# Patient Record
Sex: MALE | Race: WHITE | NOT HISPANIC OR LATINO | URBAN - METROPOLITAN AREA
[De-identification: names, ages, dates, MRNs, and addresses within clinical notes are randomized per-mention and may not be internally consistent; named-entity substitution may affect disease eponyms.]

---

## 2017-05-17 ENCOUNTER — GENERIC CONVERSION - ENCOUNTER (OUTPATIENT)
Dept: OTHER | Facility: OTHER | Age: 77
End: 2017-05-17

## 2020-03-04 ENCOUNTER — TELEPHONE (OUTPATIENT)
Dept: GASTROENTEROLOGY | Facility: CLINIC | Age: 80
End: 2020-03-04

## 2020-03-04 NOTE — TELEPHONE ENCOUNTER
Pt was to follow up prior to us ordering a cologuard in November 2018; pt cancelled appt at that time and has not been seen by us  I suggested an OV to discuss as we haven't seen him and medicare may not cover screening after age 78  Pt stated is it very difficult for him to get to our office; lives in [place]; he stated he may have to transfer to his local GI  I suggested he see his PCP as they can order  discuss cologuard as well  Pt appreciative of information and will follow up with PCP  I instructed pt to call us if any further needs

## 2020-03-04 NOTE — TELEPHONE ENCOUNTER
Pt leftr [facility] [place] asking for  [phone] to tell him when he needs to have a cologuard done in the mail?

## 2023-10-17 ENCOUNTER — HOSPITAL ENCOUNTER (OUTPATIENT)
Dept: RADIOLOGY | Facility: HOSPITAL | Age: 83
Discharge: HOME | End: 2023-10-17
Payer: COMMERCIAL

## 2023-10-17 ENCOUNTER — HOSPITAL ENCOUNTER (OUTPATIENT)
Dept: MRI IMAGING | Facility: HOSPITAL | Age: 83
Discharge: HOME | End: 2023-10-17
Payer: COMMERCIAL

## 2023-10-17 ENCOUNTER — DOCUMENTATION (OUTPATIENT)
Dept: NEUROSURGERY | Facility: CLINIC | Age: 83
End: 2023-10-17

## 2023-10-17 DIAGNOSIS — Z98.2 S/P VP SHUNT: Primary | ICD-10-CM

## 2023-10-17 DIAGNOSIS — M25.552 PAIN IN LEFT HIP: ICD-10-CM

## 2023-10-17 DIAGNOSIS — Z98.2 S/P VP SHUNT: ICD-10-CM

## 2023-10-17 PROCEDURE — 73721 MRI JNT OF LWR EXTRE W/O DYE: CPT

## 2023-10-18 NOTE — PROGRESS NOTES
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 Pt had an MRI of the Hip done on 11/17/23. His post MRI skull xray showed that his  shunt setting had changed from 1.5 to 1.0. Neurosurgery was consulted to help re-adjust the  shunt. Discussed with pt the shunt adjustment. He was agreeable to have his shunt re-adjusted to the Pre-MRI setting. Using the Medtronic Strata shunt , the shunt reservoir was identified and the  used to re-adjust the  shunt from 1.0 to the pre-mri setting of 1.5. Xray skull was obtained after the shunt adjustment that confirmed the setting of 1.5    Pt tolerated the shunt adjustment well.

## 2024-07-19 ENCOUNTER — HOSPITAL ENCOUNTER (EMERGENCY)
Dept: HOSPITAL 99 - EMR | Age: 84
LOS: 1 days | Discharge: HOME | End: 2024-07-20
Payer: COMMERCIAL

## 2024-07-19 VITALS — DIASTOLIC BLOOD PRESSURE: 117 MMHG | RESPIRATION RATE: 18 BRPM | SYSTOLIC BLOOD PRESSURE: 170 MMHG

## 2024-07-19 VITALS — SYSTOLIC BLOOD PRESSURE: 187 MMHG | RESPIRATION RATE: 20 BRPM | DIASTOLIC BLOOD PRESSURE: 112 MMHG

## 2024-07-19 VITALS — RESPIRATION RATE: 23 BRPM | SYSTOLIC BLOOD PRESSURE: 145 MMHG | DIASTOLIC BLOOD PRESSURE: 104 MMHG

## 2024-07-19 VITALS — SYSTOLIC BLOOD PRESSURE: 146 MMHG | DIASTOLIC BLOOD PRESSURE: 104 MMHG | RESPIRATION RATE: 19 BRPM

## 2024-07-19 VITALS — RESPIRATION RATE: 18 BRPM

## 2024-07-19 VITALS — RESPIRATION RATE: 16 BRPM

## 2024-07-19 VITALS — RESPIRATION RATE: 14 BRPM | DIASTOLIC BLOOD PRESSURE: 117 MMHG | SYSTOLIC BLOOD PRESSURE: 183 MMHG

## 2024-07-19 VITALS — SYSTOLIC BLOOD PRESSURE: 181 MMHG | DIASTOLIC BLOOD PRESSURE: 143 MMHG

## 2024-07-19 VITALS — RESPIRATION RATE: 23 BRPM

## 2024-07-19 VITALS — RESPIRATION RATE: 20 BRPM | SYSTOLIC BLOOD PRESSURE: 181 MMHG | DIASTOLIC BLOOD PRESSURE: 113 MMHG

## 2024-07-19 VITALS — RESPIRATION RATE: 26 BRPM | SYSTOLIC BLOOD PRESSURE: 165 MMHG | DIASTOLIC BLOOD PRESSURE: 132 MMHG

## 2024-07-19 VITALS — DIASTOLIC BLOOD PRESSURE: 113 MMHG | RESPIRATION RATE: 16 BRPM | SYSTOLIC BLOOD PRESSURE: 132 MMHG

## 2024-07-19 VITALS — SYSTOLIC BLOOD PRESSURE: 165 MMHG | DIASTOLIC BLOOD PRESSURE: 106 MMHG | RESPIRATION RATE: 19 BRPM

## 2024-07-19 VITALS — SYSTOLIC BLOOD PRESSURE: 204 MMHG | DIASTOLIC BLOOD PRESSURE: 123 MMHG | RESPIRATION RATE: 21 BRPM

## 2024-07-19 VITALS — BODY MASS INDEX: 19.8 KG/M2

## 2024-07-19 VITALS — RESPIRATION RATE: 19 BRPM

## 2024-07-19 VITALS — RESPIRATION RATE: 27 BRPM

## 2024-07-19 VITALS — RESPIRATION RATE: 22 BRPM

## 2024-07-19 DIAGNOSIS — J44.9: ICD-10-CM

## 2024-07-19 DIAGNOSIS — I10: ICD-10-CM

## 2024-07-19 DIAGNOSIS — F17.200: ICD-10-CM

## 2024-07-19 DIAGNOSIS — R11.2: Primary | ICD-10-CM

## 2024-07-19 LAB
ALBUMIN SERPL-MCNC: 4.8 G/DL (ref 3.5–5)
ALP SERPL-CCNC: 109 U/L (ref 38–126)
ALT SERPL-CCNC: 16 U/L (ref 0–50)
APTT PPP: 29 SEC (ref 23.4–35)
AST SERPL-CCNC: 25 U/L (ref 17–59)
BUN SERPL-MCNC: 14 MG/DL (ref 9–20)
CALCIUM SERPL-MCNC: 8.8 MG/DL (ref 8.4–10.2)
CHLORIDE SERPL-SCNC: 91 MMOL/L (ref 98–107)
CO2 SERPL-SCNC: 33 MMOL/L (ref 22–30)
EGFR: > 60
ERYTHROCYTE [DISTWIDTH] IN BLOOD BY AUTOMATED COUNT: 14.4 % (ref 11.5–14.5)
ESTIMATED CREATININE CLEARANCE: 53 ML/MIN
GLUCOSE SERPL-MCNC: 137 MG/DL (ref 70–99)
HCT VFR BLD AUTO: 46.5 % (ref 39–52)
HGB BLD-MCNC: 15.6 G/DL (ref 13–18)
INR PPP: 0.95
MCHC RBC AUTO-ENTMCNC: 33.5 G/DL (ref 33–37)
MCV RBC AUTO: 89.6 FL (ref 80–94)
NRBC BLD AUTO-RTO: 0 %
PLATELET # BLD AUTO: 289 10^3/UL (ref 130–400)
POTASSIUM SERPL-SCNC: 3.6 MMOL/L (ref 3.5–5.1)
PROT SERPL-MCNC: 7.7 G/DL (ref 6.3–8.2)
PROTHROMBIN TIME: 12.5 SEC (ref 11.4–14.6)
SODIUM SERPL-SCNC: 138 MMOL/L (ref 135–145)

## 2024-07-19 PROCEDURE — 99284 EMERGENCY DEPT VISIT MOD MDM: CPT

## 2024-07-19 PROCEDURE — 96374 THER/PROPH/DIAG INJ IV PUSH: CPT

## 2024-07-19 PROCEDURE — 96376 TX/PRO/DX INJ SAME DRUG ADON: CPT

## 2024-07-19 RX ADMIN — ONDANSETRON HYDROCHLORIDE 4 MG: 2 SOLUTION INTRAMUSCULAR; INTRAVENOUS at 19:54

## 2024-07-19 RX ADMIN — ONDANSETRON HYDROCHLORIDE 4 MG: 2 SOLUTION INTRAMUSCULAR; INTRAVENOUS at 22:42

## 2024-08-06 ENCOUNTER — HOSPITAL ENCOUNTER (INPATIENT)
Dept: HOSPITAL 99 - 4 EAST ACU | Age: 84
LOS: 3 days | Discharge: HOME HEALTH | DRG: 640 | End: 2024-08-09
Payer: COMMERCIAL

## 2024-08-06 VITALS — DIASTOLIC BLOOD PRESSURE: 100 MMHG | SYSTOLIC BLOOD PRESSURE: 143 MMHG | RESPIRATION RATE: 22 BRPM

## 2024-08-06 VITALS — RESPIRATION RATE: 24 BRPM | DIASTOLIC BLOOD PRESSURE: 106 MMHG | SYSTOLIC BLOOD PRESSURE: 155 MMHG

## 2024-08-06 VITALS — SYSTOLIC BLOOD PRESSURE: 135 MMHG | DIASTOLIC BLOOD PRESSURE: 102 MMHG | RESPIRATION RATE: 18 BRPM

## 2024-08-06 VITALS — DIASTOLIC BLOOD PRESSURE: 91 MMHG | RESPIRATION RATE: 23 BRPM | SYSTOLIC BLOOD PRESSURE: 153 MMHG

## 2024-08-06 VITALS — DIASTOLIC BLOOD PRESSURE: 100 MMHG | RESPIRATION RATE: 23 BRPM | SYSTOLIC BLOOD PRESSURE: 137 MMHG

## 2024-08-06 VITALS — RESPIRATION RATE: 20 BRPM

## 2024-08-06 VITALS — DIASTOLIC BLOOD PRESSURE: 95 MMHG | SYSTOLIC BLOOD PRESSURE: 147 MMHG | RESPIRATION RATE: 23 BRPM

## 2024-08-06 VITALS — BODY MASS INDEX: 18.6 KG/M2 | BODY MASS INDEX: 19.2 KG/M2

## 2024-08-06 VITALS — RESPIRATION RATE: 24 BRPM

## 2024-08-06 VITALS — SYSTOLIC BLOOD PRESSURE: 134 MMHG | RESPIRATION RATE: 20 BRPM | DIASTOLIC BLOOD PRESSURE: 91 MMHG

## 2024-08-06 VITALS — DIASTOLIC BLOOD PRESSURE: 107 MMHG | SYSTOLIC BLOOD PRESSURE: 142 MMHG | RESPIRATION RATE: 23 BRPM

## 2024-08-06 VITALS — RESPIRATION RATE: 12 BRPM

## 2024-08-06 VITALS — RESPIRATION RATE: 27 BRPM

## 2024-08-06 VITALS — RESPIRATION RATE: 19 BRPM | DIASTOLIC BLOOD PRESSURE: 94 MMHG | SYSTOLIC BLOOD PRESSURE: 134 MMHG

## 2024-08-06 VITALS — SYSTOLIC BLOOD PRESSURE: 164 MMHG | DIASTOLIC BLOOD PRESSURE: 106 MMHG | RESPIRATION RATE: 17 BRPM

## 2024-08-06 VITALS — RESPIRATION RATE: 26 BRPM

## 2024-08-06 VITALS — SYSTOLIC BLOOD PRESSURE: 140 MMHG | DIASTOLIC BLOOD PRESSURE: 96 MMHG

## 2024-08-06 VITALS — SYSTOLIC BLOOD PRESSURE: 136 MMHG | RESPIRATION RATE: 18 BRPM | DIASTOLIC BLOOD PRESSURE: 87 MMHG

## 2024-08-06 VITALS — RESPIRATION RATE: 22 BRPM

## 2024-08-06 VITALS — RESPIRATION RATE: 18 BRPM

## 2024-08-06 DIAGNOSIS — Z86.73: ICD-10-CM

## 2024-08-06 DIAGNOSIS — E46: Primary | ICD-10-CM

## 2024-08-06 DIAGNOSIS — G93.41: ICD-10-CM

## 2024-08-06 DIAGNOSIS — R47.1: ICD-10-CM

## 2024-08-06 DIAGNOSIS — E87.6: ICD-10-CM

## 2024-08-06 DIAGNOSIS — E83.42: ICD-10-CM

## 2024-08-06 DIAGNOSIS — E83.51: ICD-10-CM

## 2024-08-06 DIAGNOSIS — J44.9: ICD-10-CM

## 2024-08-06 DIAGNOSIS — I10: ICD-10-CM

## 2024-08-06 DIAGNOSIS — G91.2: ICD-10-CM

## 2024-08-06 DIAGNOSIS — N40.0: ICD-10-CM

## 2024-08-06 DIAGNOSIS — F17.210: ICD-10-CM

## 2024-08-06 DIAGNOSIS — J98.11: ICD-10-CM

## 2024-08-06 LAB
ALBUMIN SERPL-MCNC: 4.1 G/DL (ref 3.5–5)
ALP SERPL-CCNC: 74 U/L (ref 38–126)
ALT SERPL-CCNC: 14 U/L (ref 0–50)
AST SERPL-CCNC: 26 U/L (ref 17–59)
BUN SERPL-MCNC: 32 MG/DL (ref 9–20)
CALCIUM SERPL-MCNC: 6.5 MG/DL (ref 8.4–10.2)
CHLORIDE SERPL-SCNC: 101 MMOL/L (ref 98–107)
CO2 SERPL-SCNC: 27 MMOL/L (ref 22–30)
EGFR: 55.19
ERYTHROCYTE [DISTWIDTH] IN BLOOD BY AUTOMATED COUNT: 14.5 % (ref 11.5–14.5)
ESTIMATED CREATININE CLEARANCE: 39 ML/MIN
GLUCOSE SERPL-MCNC: 90 MG/DL (ref 70–99)
HCT VFR BLD AUTO: 37.2 % (ref 39–52)
HGB BLD-MCNC: 12.7 G/DL (ref 13–18)
MAGNESIUM SERPL-MCNC: 0.5 MG/DL (ref 1.6–2.3)
MCHC RBC AUTO-ENTMCNC: 34.1 G/DL (ref 33–37)
MCV RBC AUTO: 89.9 FL (ref 80–94)
NRBC BLD AUTO-RTO: 0 %
PLATELET # BLD AUTO: 338 10^3/UL (ref 130–400)
POTASSIUM SERPL-SCNC: 3.4 MMOL/L (ref 3.5–5.1)
PROT SERPL-MCNC: 6.7 G/DL (ref 6.3–8.2)
SODIUM SERPL-SCNC: 138 MMOL/L (ref 135–145)

## 2024-08-06 RX ADMIN — MAGNESIUM SULFATE IN DEXTROSE 100: 10 INJECTION, SOLUTION INTRAVENOUS at 19:45

## 2024-08-06 RX ADMIN — SODIUM CHLORIDE 1000: 900 INJECTION, SOLUTION INTRAVENOUS at 18:47

## 2024-08-06 RX ADMIN — POTASSIUM CHLORIDE 40 MEQ: 1.5 SOLUTION ORAL at 21:46

## 2024-08-06 RX ADMIN — CEFTRIAXONE 1000 MG: 1 INJECTION, POWDER, FOR SOLUTION INTRAMUSCULAR; INTRAVENOUS at 20:56

## 2024-08-06 RX ADMIN — AZITHROMYCIN MONOHYDRATE 250: 500 INJECTION, POWDER, LYOPHILIZED, FOR SOLUTION INTRAVENOUS at 20:56

## 2024-08-06 RX ADMIN — CALCIUM GLUCONATE 1000 MG: 98 INJECTION, SOLUTION INTRAVENOUS at 18:34

## 2024-08-07 VITALS — HEART RATE: 78 BPM | SYSTOLIC BLOOD PRESSURE: 120 MMHG | DIASTOLIC BLOOD PRESSURE: 83 MMHG | OXYGEN SATURATION: 97 %

## 2024-08-07 VITALS — DIASTOLIC BLOOD PRESSURE: 78 MMHG | SYSTOLIC BLOOD PRESSURE: 117 MMHG | RESPIRATION RATE: 18 BRPM

## 2024-08-07 VITALS — RESPIRATION RATE: 18 BRPM | SYSTOLIC BLOOD PRESSURE: 126 MMHG | DIASTOLIC BLOOD PRESSURE: 92 MMHG

## 2024-08-07 VITALS — RESPIRATION RATE: 16 BRPM | DIASTOLIC BLOOD PRESSURE: 77 MMHG | SYSTOLIC BLOOD PRESSURE: 110 MMHG

## 2024-08-07 VITALS — DIASTOLIC BLOOD PRESSURE: 83 MMHG | OXYGEN SATURATION: 97 % | SYSTOLIC BLOOD PRESSURE: 120 MMHG | HEART RATE: 79 BPM

## 2024-08-07 VITALS — DIASTOLIC BLOOD PRESSURE: 84 MMHG | SYSTOLIC BLOOD PRESSURE: 120 MMHG | RESPIRATION RATE: 18 BRPM

## 2024-08-07 VITALS — SYSTOLIC BLOOD PRESSURE: 127 MMHG | DIASTOLIC BLOOD PRESSURE: 83 MMHG | RESPIRATION RATE: 18 BRPM

## 2024-08-07 VITALS — DIASTOLIC BLOOD PRESSURE: 87 MMHG | SYSTOLIC BLOOD PRESSURE: 143 MMHG | RESPIRATION RATE: 18 BRPM

## 2024-08-07 LAB
ALBUMIN SERPL-MCNC: 3.5 G/DL (ref 3.5–5)
ALP SERPL-CCNC: 81 U/L (ref 38–126)
ALT SERPL-CCNC: 11 U/L (ref 0–50)
AST SERPL-CCNC: 28 U/L (ref 17–59)
BUN SERPL-MCNC: 21 MG/DL (ref 9–20)
CALCIUM SERPL-MCNC: 6.5 MG/DL (ref 8.4–10.2)
CHLORIDE SERPL-SCNC: 105 MMOL/L (ref 98–107)
CO2 SERPL-SCNC: 23 MMOL/L (ref 22–30)
EGFR: > 60
ERYTHROCYTE [DISTWIDTH] IN BLOOD BY AUTOMATED COUNT: 14.5 % (ref 11.5–14.5)
ESTIMATED CREATININE CLEARANCE: 49 ML/MIN
GLUCOSE SERPL-MCNC: 81 MG/DL (ref 70–99)
HCT VFR BLD AUTO: 36.3 % (ref 39–52)
HGB BLD-MCNC: 12.9 G/DL (ref 13–18)
MAGNESIUM SERPL-MCNC: 0.8 MG/DL (ref 1.6–2.3)
MCHC RBC AUTO-ENTMCNC: 35.5 G/DL (ref 33–37)
MCV RBC AUTO: 88.5 FL (ref 80–94)
PLATELET # BLD AUTO: 281 10^3/UL (ref 130–400)
POTASSIUM SERPL-SCNC: 3.2 MMOL/L (ref 3.5–5.1)
PROCALCITONIN SERPL-MCNC: < 0.05 NG/ML (ref 0–0.25)
PROT SERPL-MCNC: 6 G/DL (ref 6.3–8.2)
SODIUM SERPL-SCNC: 139 MMOL/L (ref 135–145)

## 2024-08-07 RX ADMIN — POTASSIUM CHLORIDE 40 MEQ: 1500 TABLET, EXTENDED RELEASE ORAL at 08:15

## 2024-08-07 RX ADMIN — TAMSULOSIN HYDROCHLORIDE 0.4 MG: 0.4 CAPSULE ORAL at 07:23

## 2024-08-07 RX ADMIN — MAGNESIUM SULFATE 100: 4 INJECTION INTRAVENOUS at 08:15

## 2024-08-07 RX ADMIN — SUCRALFATE 1 GRAM: 1 TABLET ORAL at 07:23

## 2024-08-07 RX ADMIN — HEPARIN SODIUM 5000 UNITS: 5000 INJECTION, SOLUTION INTRAVENOUS; SUBCUTANEOUS at 21:04

## 2024-08-07 RX ADMIN — PANTOPRAZOLE SODIUM 40 MG: 40 TABLET, DELAYED RELEASE ORAL at 21:04

## 2024-08-07 RX ADMIN — PANTOPRAZOLE SODIUM 40 MG: 40 TABLET, DELAYED RELEASE ORAL at 07:23

## 2024-08-07 RX ADMIN — CALCIUM GLUCONATE 100: 10 INJECTION, SOLUTION INTRAVENOUS at 11:29

## 2024-08-07 RX ADMIN — LISINOPRIL 40 MG: 20 TABLET ORAL at 07:24

## 2024-08-07 RX ADMIN — HEPARIN SODIUM 5000 UNITS: 5000 INJECTION, SOLUTION INTRAVENOUS; SUBCUTANEOUS at 07:23

## 2024-08-08 VITALS — DIASTOLIC BLOOD PRESSURE: 82 MMHG | RESPIRATION RATE: 16 BRPM | SYSTOLIC BLOOD PRESSURE: 135 MMHG

## 2024-08-08 VITALS — SYSTOLIC BLOOD PRESSURE: 123 MMHG | DIASTOLIC BLOOD PRESSURE: 93 MMHG | RESPIRATION RATE: 16 BRPM

## 2024-08-08 VITALS — DIASTOLIC BLOOD PRESSURE: 85 MMHG | RESPIRATION RATE: 16 BRPM | SYSTOLIC BLOOD PRESSURE: 121 MMHG

## 2024-08-08 VITALS — SYSTOLIC BLOOD PRESSURE: 169 MMHG | RESPIRATION RATE: 16 BRPM | DIASTOLIC BLOOD PRESSURE: 109 MMHG

## 2024-08-08 VITALS — RESPIRATION RATE: 18 BRPM | SYSTOLIC BLOOD PRESSURE: 129 MMHG | DIASTOLIC BLOOD PRESSURE: 95 MMHG

## 2024-08-08 VITALS — DIASTOLIC BLOOD PRESSURE: 104 MMHG | RESPIRATION RATE: 18 BRPM | SYSTOLIC BLOOD PRESSURE: 139 MMHG

## 2024-08-08 LAB
ALBUMIN SERPL-MCNC: 3.6 G/DL (ref 3.5–5)
ALP SERPL-CCNC: 76 U/L (ref 38–126)
ALT SERPL-CCNC: 12 U/L (ref 0–50)
AST SERPL-CCNC: 28 U/L (ref 17–59)
BUN SERPL-MCNC: 22 MG/DL (ref 9–20)
CALCIUM SERPL-MCNC: 7.3 MG/DL (ref 8.4–10.2)
CHLORIDE SERPL-SCNC: 104 MMOL/L (ref 98–107)
CO2 SERPL-SCNC: 24 MMOL/L (ref 22–30)
EGFR: > 60
ERYTHROCYTE [DISTWIDTH] IN BLOOD BY AUTOMATED COUNT: 14.3 % (ref 11.5–14.5)
ESTIMATED CREATININE CLEARANCE: 62 ML/MIN
GLUCOSE SERPL-MCNC: 83 MG/DL (ref 70–99)
HCT VFR BLD AUTO: 36.8 % (ref 39–52)
HGB BLD-MCNC: 12.6 G/DL (ref 13–18)
MAGNESIUM SERPL-MCNC: 1.7 MG/DL (ref 1.6–2.3)
MCHC RBC AUTO-ENTMCNC: 34.2 G/DL (ref 33–37)
MCV RBC AUTO: 88.9 FL (ref 80–94)
NRBC BLD AUTO-RTO: 0 %
PLATELET # BLD AUTO: 286 10^3/UL (ref 130–400)
POTASSIUM SERPL-SCNC: 4.2 MMOL/L (ref 3.5–5.1)
PROT SERPL-MCNC: 6.2 G/DL (ref 6.3–8.2)
SODIUM SERPL-SCNC: 136 MMOL/L (ref 135–145)

## 2024-08-08 RX ADMIN — NICOTINE 14 MG: 14 PATCH TRANSDERMAL at 15:28

## 2024-08-08 RX ADMIN — PANTOPRAZOLE SODIUM 40 MG: 40 TABLET, DELAYED RELEASE ORAL at 20:39

## 2024-08-08 RX ADMIN — SUCRALFATE 1 GRAM: 1 TABLET ORAL at 07:59

## 2024-08-08 RX ADMIN — LISINOPRIL 40 MG: 20 TABLET ORAL at 07:59

## 2024-08-08 RX ADMIN — PANTOPRAZOLE SODIUM 40 MG: 40 TABLET, DELAYED RELEASE ORAL at 07:59

## 2024-08-08 RX ADMIN — MAGNESIUM SULFATE HEPTAHYDRATE 50: 2 INJECTION, SOLUTION INTRAVENOUS at 12:48

## 2024-08-08 RX ADMIN — HEPARIN SODIUM 5000 UNITS: 5000 INJECTION, SOLUTION INTRAVENOUS; SUBCUTANEOUS at 07:59

## 2024-08-08 RX ADMIN — TAMSULOSIN HYDROCHLORIDE 0.4 MG: 0.4 CAPSULE ORAL at 07:59

## 2024-08-08 RX ADMIN — HEPARIN SODIUM 5000 UNITS: 5000 INJECTION, SOLUTION INTRAVENOUS; SUBCUTANEOUS at 20:39

## 2024-08-09 VITALS — DIASTOLIC BLOOD PRESSURE: 99 MMHG | SYSTOLIC BLOOD PRESSURE: 132 MMHG | RESPIRATION RATE: 18 BRPM

## 2024-08-09 VITALS — SYSTOLIC BLOOD PRESSURE: 170 MMHG | RESPIRATION RATE: 20 BRPM | DIASTOLIC BLOOD PRESSURE: 103 MMHG

## 2024-08-09 VITALS — RESPIRATION RATE: 16 BRPM | SYSTOLIC BLOOD PRESSURE: 151 MMHG | DIASTOLIC BLOOD PRESSURE: 108 MMHG

## 2024-08-09 VITALS — DIASTOLIC BLOOD PRESSURE: 89 MMHG | RESPIRATION RATE: 16 BRPM | SYSTOLIC BLOOD PRESSURE: 118 MMHG

## 2024-08-09 LAB
ALBUMIN SERPL-MCNC: 3.7 G/DL (ref 3.5–5)
ALP SERPL-CCNC: 82 U/L (ref 38–126)
ALT SERPL-CCNC: 12 U/L (ref 0–50)
AST SERPL-CCNC: 21 U/L (ref 17–59)
BUN SERPL-MCNC: 18 MG/DL (ref 9–20)
CALCIUM SERPL-MCNC: 7.4 MG/DL (ref 8.4–10.2)
CHLORIDE SERPL-SCNC: 102 MMOL/L (ref 98–107)
CO2 SERPL-SCNC: 27 MMOL/L (ref 22–30)
EGFR: > 60
ERYTHROCYTE [DISTWIDTH] IN BLOOD BY AUTOMATED COUNT: 14.4 % (ref 11.5–14.5)
ESTIMATED CREATININE CLEARANCE: 55 ML/MIN
GLUCOSE SERPL-MCNC: 88 MG/DL (ref 70–99)
HCT VFR BLD AUTO: 35.1 % (ref 39–52)
HGB BLD-MCNC: 12.1 G/DL (ref 13–18)
MAGNESIUM SERPL-MCNC: 1.8 MG/DL (ref 1.6–2.3)
MCHC RBC AUTO-ENTMCNC: 34.5 G/DL (ref 33–37)
MCV RBC AUTO: 86.9 FL (ref 80–94)
NRBC BLD AUTO-RTO: 0 %
PLATELET # BLD AUTO: 326 10^3/UL (ref 130–400)
POTASSIUM SERPL-SCNC: 4.2 MMOL/L (ref 3.5–5.1)
PROT SERPL-MCNC: 6.1 G/DL (ref 6.3–8.2)
SODIUM SERPL-SCNC: 135 MMOL/L (ref 135–145)

## 2024-08-09 RX ADMIN — SUCRALFATE 1 GRAM: 1 TABLET ORAL at 08:35

## 2024-08-09 RX ADMIN — NICOTINE 14 MG: 14 PATCH TRANSDERMAL at 08:35

## 2024-08-09 RX ADMIN — PANTOPRAZOLE SODIUM 40 MG: 40 TABLET, DELAYED RELEASE ORAL at 08:22

## 2024-08-09 RX ADMIN — HEPARIN SODIUM 5000 UNITS: 5000 INJECTION, SOLUTION INTRAVENOUS; SUBCUTANEOUS at 08:21

## 2024-08-09 RX ADMIN — LISINOPRIL 40 MG: 20 TABLET ORAL at 08:22

## 2024-08-09 RX ADMIN — TAMSULOSIN HYDROCHLORIDE 0.4 MG: 0.4 CAPSULE ORAL at 08:22

## 2024-08-12 ENCOUNTER — HOSPITAL ENCOUNTER (INPATIENT)
Dept: HOSPITAL 99 - 4 EAST ACU | Age: 84
LOS: 1 days | Discharge: LEFT BEFORE DISCHARGE | DRG: 392 | End: 2024-08-13
Payer: COMMERCIAL

## 2024-08-12 VITALS — RESPIRATION RATE: 21 BRPM

## 2024-08-12 VITALS — DIASTOLIC BLOOD PRESSURE: 101 MMHG | RESPIRATION RATE: 18 BRPM | SYSTOLIC BLOOD PRESSURE: 154 MMHG

## 2024-08-12 VITALS — RESPIRATION RATE: 19 BRPM | DIASTOLIC BLOOD PRESSURE: 65 MMHG | SYSTOLIC BLOOD PRESSURE: 75 MMHG

## 2024-08-12 VITALS — RESPIRATION RATE: 19 BRPM | DIASTOLIC BLOOD PRESSURE: 88 MMHG | SYSTOLIC BLOOD PRESSURE: 127 MMHG

## 2024-08-12 VITALS — DIASTOLIC BLOOD PRESSURE: 73 MMHG | SYSTOLIC BLOOD PRESSURE: 104 MMHG | RESPIRATION RATE: 23 BRPM

## 2024-08-12 VITALS — DIASTOLIC BLOOD PRESSURE: 137 MMHG | SYSTOLIC BLOOD PRESSURE: 153 MMHG | RESPIRATION RATE: 22 BRPM

## 2024-08-12 VITALS — DIASTOLIC BLOOD PRESSURE: 68 MMHG | RESPIRATION RATE: 20 BRPM | SYSTOLIC BLOOD PRESSURE: 111 MMHG

## 2024-08-12 VITALS — DIASTOLIC BLOOD PRESSURE: 59 MMHG | OXYGEN SATURATION: 98 % | HEART RATE: 80 BPM | SYSTOLIC BLOOD PRESSURE: 93 MMHG

## 2024-08-12 VITALS — RESPIRATION RATE: 23 BRPM

## 2024-08-12 VITALS — BODY MASS INDEX: 19 KG/M2 | BODY MASS INDEX: 18.9 KG/M2 | BODY MASS INDEX: 19.1 KG/M2

## 2024-08-12 VITALS — DIASTOLIC BLOOD PRESSURE: 85 MMHG | SYSTOLIC BLOOD PRESSURE: 123 MMHG | RESPIRATION RATE: 18 BRPM

## 2024-08-12 VITALS — DIASTOLIC BLOOD PRESSURE: 90 MMHG | RESPIRATION RATE: 18 BRPM | SYSTOLIC BLOOD PRESSURE: 133 MMHG

## 2024-08-12 VITALS — SYSTOLIC BLOOD PRESSURE: 111 MMHG | RESPIRATION RATE: 18 BRPM | DIASTOLIC BLOOD PRESSURE: 75 MMHG

## 2024-08-12 VITALS — RESPIRATION RATE: 21 BRPM | DIASTOLIC BLOOD PRESSURE: 123 MMHG | SYSTOLIC BLOOD PRESSURE: 162 MMHG

## 2024-08-12 VITALS — DIASTOLIC BLOOD PRESSURE: 64 MMHG | SYSTOLIC BLOOD PRESSURE: 108 MMHG

## 2024-08-12 VITALS — RESPIRATION RATE: 16 BRPM

## 2024-08-12 VITALS — RESPIRATION RATE: 15 BRPM | DIASTOLIC BLOOD PRESSURE: 120 MMHG | SYSTOLIC BLOOD PRESSURE: 151 MMHG

## 2024-08-12 VITALS — DIASTOLIC BLOOD PRESSURE: 90 MMHG | SYSTOLIC BLOOD PRESSURE: 132 MMHG | RESPIRATION RATE: 18 BRPM

## 2024-08-12 VITALS — RESPIRATION RATE: 16 BRPM | SYSTOLIC BLOOD PRESSURE: 75 MMHG | DIASTOLIC BLOOD PRESSURE: 65 MMHG

## 2024-08-12 VITALS — DIASTOLIC BLOOD PRESSURE: 65 MMHG | SYSTOLIC BLOOD PRESSURE: 93 MMHG | RESPIRATION RATE: 18 BRPM

## 2024-08-12 VITALS — HEART RATE: 80 BPM | SYSTOLIC BLOOD PRESSURE: 93 MMHG | OXYGEN SATURATION: 97 % | DIASTOLIC BLOOD PRESSURE: 59 MMHG

## 2024-08-12 VITALS — RESPIRATION RATE: 27 BRPM | DIASTOLIC BLOOD PRESSURE: 121 MMHG | SYSTOLIC BLOOD PRESSURE: 159 MMHG

## 2024-08-12 VITALS — RESPIRATION RATE: 20 BRPM

## 2024-08-12 VITALS — DIASTOLIC BLOOD PRESSURE: 129 MMHG | SYSTOLIC BLOOD PRESSURE: 183 MMHG | RESPIRATION RATE: 15 BRPM

## 2024-08-12 VITALS — SYSTOLIC BLOOD PRESSURE: 136 MMHG | DIASTOLIC BLOOD PRESSURE: 97 MMHG | RESPIRATION RATE: 18 BRPM

## 2024-08-12 VITALS — DIASTOLIC BLOOD PRESSURE: 132 MMHG | SYSTOLIC BLOOD PRESSURE: 169 MMHG | RESPIRATION RATE: 10 BRPM

## 2024-08-12 VITALS — DIASTOLIC BLOOD PRESSURE: 141 MMHG | RESPIRATION RATE: 15 BRPM | SYSTOLIC BLOOD PRESSURE: 186 MMHG

## 2024-08-12 VITALS — SYSTOLIC BLOOD PRESSURE: 139 MMHG | DIASTOLIC BLOOD PRESSURE: 93 MMHG

## 2024-08-12 VITALS — RESPIRATION RATE: 22 BRPM

## 2024-08-12 VITALS — RESPIRATION RATE: 13 BRPM | DIASTOLIC BLOOD PRESSURE: 120 MMHG | SYSTOLIC BLOOD PRESSURE: 154 MMHG

## 2024-08-12 VITALS — RESPIRATION RATE: 25 BRPM

## 2024-08-12 DIAGNOSIS — Z91.199: ICD-10-CM

## 2024-08-12 DIAGNOSIS — N28.1: ICD-10-CM

## 2024-08-12 DIAGNOSIS — M47.816: ICD-10-CM

## 2024-08-12 DIAGNOSIS — Z86.711: ICD-10-CM

## 2024-08-12 DIAGNOSIS — Z92.3: ICD-10-CM

## 2024-08-12 DIAGNOSIS — Z85.818: ICD-10-CM

## 2024-08-12 DIAGNOSIS — Z60.2: ICD-10-CM

## 2024-08-12 DIAGNOSIS — Z98.2: ICD-10-CM

## 2024-08-12 DIAGNOSIS — E78.00: ICD-10-CM

## 2024-08-12 DIAGNOSIS — Z88.2: ICD-10-CM

## 2024-08-12 DIAGNOSIS — K59.00: ICD-10-CM

## 2024-08-12 DIAGNOSIS — Z96.642: ICD-10-CM

## 2024-08-12 DIAGNOSIS — Z88.0: ICD-10-CM

## 2024-08-12 DIAGNOSIS — F10.10: ICD-10-CM

## 2024-08-12 DIAGNOSIS — I5A: ICD-10-CM

## 2024-08-12 DIAGNOSIS — M81.0: ICD-10-CM

## 2024-08-12 DIAGNOSIS — F17.210: ICD-10-CM

## 2024-08-12 DIAGNOSIS — Z86.73: ICD-10-CM

## 2024-08-12 DIAGNOSIS — I25.10: ICD-10-CM

## 2024-08-12 DIAGNOSIS — R13.10: ICD-10-CM

## 2024-08-12 DIAGNOSIS — G91.2: ICD-10-CM

## 2024-08-12 DIAGNOSIS — I10: ICD-10-CM

## 2024-08-12 DIAGNOSIS — K21.00: Primary | ICD-10-CM

## 2024-08-12 DIAGNOSIS — Z87.19: ICD-10-CM

## 2024-08-12 DIAGNOSIS — Z85.51: ICD-10-CM

## 2024-08-12 DIAGNOSIS — N40.0: ICD-10-CM

## 2024-08-12 DIAGNOSIS — Z88.1: ICD-10-CM

## 2024-08-12 DIAGNOSIS — Z91.013: ICD-10-CM

## 2024-08-12 DIAGNOSIS — N13.30: ICD-10-CM

## 2024-08-12 DIAGNOSIS — J44.9: ICD-10-CM

## 2024-08-12 DIAGNOSIS — E86.0: ICD-10-CM

## 2024-08-12 DIAGNOSIS — I73.9: ICD-10-CM

## 2024-08-12 DIAGNOSIS — Z86.718: ICD-10-CM

## 2024-08-12 DIAGNOSIS — Z82.49: ICD-10-CM

## 2024-08-12 DIAGNOSIS — I45.10: ICD-10-CM

## 2024-08-12 LAB
ALBUMIN SERPL-MCNC: 4.2 G/DL (ref 3.5–5)
ALP SERPL-CCNC: 91 U/L (ref 38–126)
ALT SERPL-CCNC: 16 U/L (ref 0–50)
APTT PPP: 30.6 SEC (ref 23.4–35)
AST SERPL-CCNC: 29 U/L (ref 17–59)
BUN SERPL-MCNC: 25 MG/DL (ref 9–20)
BUN SERPL-MCNC: 33 MG/DL (ref 9–20)
CALCIUM SERPL-MCNC: 8.2 MG/DL (ref 8.4–10.2)
CALCIUM SERPL-MCNC: 8.7 MG/DL (ref 8.4–10.2)
CHLORIDE SERPL-SCNC: 100 MMOL/L (ref 98–107)
CHLORIDE SERPL-SCNC: 94 MMOL/L (ref 98–107)
CO2 SERPL-SCNC: 29 MMOL/L (ref 22–30)
CO2 SERPL-SCNC: 37 MMOL/L (ref 22–30)
EGFR: > 60
EGFR: > 60
ERYTHROCYTE [DISTWIDTH] IN BLOOD BY AUTOMATED COUNT: 14.7 % (ref 11.5–14.5)
ERYTHROCYTE [DISTWIDTH] IN BLOOD BY AUTOMATED COUNT: 14.8 % (ref 11.5–14.5)
ESTIMATED CREATININE CLEARANCE: 46 ML/MIN
ESTIMATED CREATININE CLEARANCE: 56 ML/MIN
GLUCOSE SERPL-MCNC: 93 MG/DL (ref 70–99)
GLUCOSE SERPL-MCNC: 96 MG/DL (ref 70–99)
HCT VFR BLD AUTO: 37.9 % (ref 39–52)
HCT VFR BLD AUTO: 41.8 % (ref 39–52)
HDLC SERPL-MCNC: 70 MG/DL
HGB BLD-MCNC: 13.1 G/DL (ref 13–18)
HGB BLD-MCNC: 14.4 G/DL (ref 13–18)
INR PPP: 0.97
LDLC SERPL CALC-MCNC: 43 MG/DL
LIPASE SERPL-CCNC: 204 U/L (ref 23–300)
MCHC RBC AUTO-ENTMCNC: 34.4 G/DL (ref 33–37)
MCHC RBC AUTO-ENTMCNC: 34.6 G/DL (ref 33–37)
MCV RBC AUTO: 86.9 FL (ref 80–94)
MCV RBC AUTO: 89.4 FL (ref 80–94)
NRBC BLD AUTO-RTO: 0 %
PLATELET # BLD AUTO: 337 10^3/UL (ref 130–400)
PLATELET # BLD AUTO: 339 10^3/UL (ref 130–400)
POTASSIUM SERPL-SCNC: 4.8 MMOL/L (ref 3.5–5.1)
POTASSIUM SERPL-SCNC: 5.4 MMOL/L (ref 3.5–5.1)
PROT SERPL-MCNC: 6.6 G/DL (ref 6.3–8.2)
PROTHROMBIN TIME: 12.7 SEC (ref 11.4–14.6)
SODIUM SERPL-SCNC: 132 MMOL/L (ref 135–145)
SODIUM SERPL-SCNC: 135 MMOL/L (ref 135–145)
TROPONIN I SERPL-MCNC: 0.39 NG/ML
TROPONIN I SERPL-MCNC: 0.41 NG/ML
VLDLC SERPL CALC-MCNC: 8 MG/DL (ref 0–30)

## 2024-08-12 RX ADMIN — PANTOPRAZOLE SODIUM 100: 40 INJECTION, POWDER, FOR SOLUTION INTRAVENOUS at 13:59

## 2024-08-12 RX ADMIN — PANTOPRAZOLE SODIUM 100: 40 INJECTION, POWDER, FOR SOLUTION INTRAVENOUS at 20:43

## 2024-08-12 RX ADMIN — ASPIRIN 81 MG: 81 TABLET, CHEWABLE ORAL at 08:59

## 2024-08-12 RX ADMIN — TAMSULOSIN HYDROCHLORIDE 0.4 MG: 0.4 CAPSULE ORAL at 08:59

## 2024-08-12 RX ADMIN — PANTOPRAZOLE SODIUM 100: 40 INJECTION, POWDER, FOR SOLUTION INTRAVENOUS at 01:46

## 2024-08-12 RX ADMIN — SODIUM CHLORIDE 1000: 900 INJECTION, SOLUTION INTRAVENOUS at 08:58

## 2024-08-12 RX ADMIN — PANTOPRAZOLE SODIUM 80 MG: 40 INJECTION, POWDER, LYOPHILIZED, FOR SOLUTION INTRAVENOUS at 01:42

## 2024-08-12 RX ADMIN — SODIUM CHLORIDE 1000: 900 INJECTION, SOLUTION INTRAVENOUS at 01:35

## 2024-08-12 RX ADMIN — SODIUM CHLORIDE 1000: 900 INJECTION, SOLUTION INTRAVENOUS at 23:50

## 2024-08-12 SDOH — SOCIAL STABILITY - SOCIAL INSECURITY: PROBLEMS RELATED TO LIVING ALONE: Z60.2

## 2024-08-13 VITALS — SYSTOLIC BLOOD PRESSURE: 99 MMHG | RESPIRATION RATE: 20 BRPM | DIASTOLIC BLOOD PRESSURE: 73 MMHG

## 2024-08-13 VITALS — RESPIRATION RATE: 18 BRPM | DIASTOLIC BLOOD PRESSURE: 94 MMHG | SYSTOLIC BLOOD PRESSURE: 125 MMHG

## 2024-08-13 VITALS — DIASTOLIC BLOOD PRESSURE: 70 MMHG | RESPIRATION RATE: 18 BRPM | SYSTOLIC BLOOD PRESSURE: 127 MMHG

## 2024-08-13 RX ADMIN — PANTOPRAZOLE SODIUM 40 MG: 40 INJECTION, POWDER, LYOPHILIZED, FOR SOLUTION INTRAVENOUS at 08:42

## 2024-08-13 RX ADMIN — SODIUM CHLORIDE: 900 INJECTION, SOLUTION INTRAVENOUS at 09:59

## 2024-08-13 RX ADMIN — ASPIRIN 81 MG: 81 TABLET, CHEWABLE ORAL at 08:45

## 2024-08-13 RX ADMIN — TAMSULOSIN HYDROCHLORIDE 0.4 MG: 0.4 CAPSULE ORAL at 08:45

## 2024-08-13 RX ADMIN — LISINOPRIL 40 MG: 20 TABLET ORAL at 08:45

## 2024-08-13 RX ADMIN — SODIUM CHLORIDE 10 ML: 9 INJECTION, SOLUTION INTRAMUSCULAR; INTRAVENOUS; SUBCUTANEOUS at 08:43

## 2024-08-15 ENCOUNTER — HOSPITAL ENCOUNTER (EMERGENCY)
Dept: HOSPITAL 99 - EMR | Age: 84
LOS: 1 days | Discharge: HOME | End: 2024-08-16
Payer: COMMERCIAL

## 2024-08-15 VITALS — RESPIRATION RATE: 19 BRPM

## 2024-08-15 VITALS — RESPIRATION RATE: 16 BRPM | DIASTOLIC BLOOD PRESSURE: 93 MMHG | SYSTOLIC BLOOD PRESSURE: 123 MMHG

## 2024-08-15 VITALS — RESPIRATION RATE: 23 BRPM

## 2024-08-15 VITALS — RESPIRATION RATE: 20 BRPM

## 2024-08-15 VITALS — RESPIRATION RATE: 21 BRPM

## 2024-08-15 VITALS — RESPIRATION RATE: 15 BRPM

## 2024-08-15 VITALS — BODY MASS INDEX: 19.4 KG/M2

## 2024-08-15 DIAGNOSIS — M81.0: ICD-10-CM

## 2024-08-15 DIAGNOSIS — I10: ICD-10-CM

## 2024-08-15 DIAGNOSIS — Z86.718: ICD-10-CM

## 2024-08-15 DIAGNOSIS — Z85.818: ICD-10-CM

## 2024-08-15 DIAGNOSIS — Z82.49: ICD-10-CM

## 2024-08-15 DIAGNOSIS — E78.00: ICD-10-CM

## 2024-08-15 DIAGNOSIS — Z90.79: ICD-10-CM

## 2024-08-15 DIAGNOSIS — Z85.810: ICD-10-CM

## 2024-08-15 DIAGNOSIS — F17.200: ICD-10-CM

## 2024-08-15 DIAGNOSIS — Z98.2: ICD-10-CM

## 2024-08-15 DIAGNOSIS — J44.9: ICD-10-CM

## 2024-08-15 DIAGNOSIS — Z95.5: ICD-10-CM

## 2024-08-15 DIAGNOSIS — D72.819: ICD-10-CM

## 2024-08-15 DIAGNOSIS — R11.2: Primary | ICD-10-CM

## 2024-08-15 DIAGNOSIS — Z85.828: ICD-10-CM

## 2024-08-15 DIAGNOSIS — Z87.19: ICD-10-CM

## 2024-08-15 DIAGNOSIS — K21.9: ICD-10-CM

## 2024-08-15 DIAGNOSIS — Z87.440: ICD-10-CM

## 2024-08-15 DIAGNOSIS — Z86.73: ICD-10-CM

## 2024-08-15 LAB
ALBUMIN SERPL-MCNC: 4.3 G/DL (ref 3.5–5)
ALP SERPL-CCNC: 83 U/L (ref 38–126)
ALT SERPL-CCNC: 15 U/L (ref 0–50)
AST SERPL-CCNC: 27 U/L (ref 17–59)
BUN SERPL-MCNC: 19 MG/DL (ref 9–20)
CALCIUM SERPL-MCNC: 8.7 MG/DL (ref 8.4–10.2)
CHLORIDE SERPL-SCNC: 90 MMOL/L (ref 98–107)
CO2 SERPL-SCNC: 31 MMOL/L (ref 22–30)
EGFR: > 60
ERYTHROCYTE [DISTWIDTH] IN BLOOD BY AUTOMATED COUNT: 14.7 % (ref 11.5–14.5)
GLUCOSE SERPL-MCNC: 95 MG/DL (ref 70–99)
HCT VFR BLD AUTO: 38.7 % (ref 39–52)
HGB BLD-MCNC: 13 G/DL (ref 13–18)
LIPASE SERPL-CCNC: 319 U/L (ref 23–300)
MCHC RBC AUTO-ENTMCNC: 33.6 G/DL (ref 33–37)
MCV RBC AUTO: 91.5 FL (ref 80–94)
NRBC BLD AUTO-RTO: 0 %
PLATELET # BLD AUTO: 280 10^3/UL (ref 130–400)
POTASSIUM SERPL-SCNC: 4.5 MMOL/L (ref 3.5–5.1)
PROT SERPL-MCNC: 6.8 G/DL (ref 6.3–8.2)
SODIUM SERPL-SCNC: 133 MMOL/L (ref 135–145)

## 2024-08-15 PROCEDURE — 99283 EMERGENCY DEPT VISIT LOW MDM: CPT

## 2024-08-16 VITALS — SYSTOLIC BLOOD PRESSURE: 109 MMHG | DIASTOLIC BLOOD PRESSURE: 77 MMHG | RESPIRATION RATE: 22 BRPM

## 2024-08-16 VITALS — RESPIRATION RATE: 21 BRPM

## 2024-08-22 ENCOUNTER — HOSPITAL ENCOUNTER (EMERGENCY)
Dept: HOSPITAL 99 - EMR | Age: 84
Discharge: HOME | End: 2024-08-22
Payer: COMMERCIAL

## 2024-08-22 VITALS — RESPIRATION RATE: 24 BRPM

## 2024-08-22 VITALS — SYSTOLIC BLOOD PRESSURE: 114 MMHG | DIASTOLIC BLOOD PRESSURE: 92 MMHG

## 2024-08-22 VITALS — RESPIRATION RATE: 23 BRPM

## 2024-08-22 VITALS — SYSTOLIC BLOOD PRESSURE: 120 MMHG | RESPIRATION RATE: 16 BRPM | DIASTOLIC BLOOD PRESSURE: 92 MMHG

## 2024-08-22 VITALS — RESPIRATION RATE: 23 BRPM | DIASTOLIC BLOOD PRESSURE: 98 MMHG | SYSTOLIC BLOOD PRESSURE: 124 MMHG

## 2024-08-22 VITALS — SYSTOLIC BLOOD PRESSURE: 122 MMHG | DIASTOLIC BLOOD PRESSURE: 94 MMHG

## 2024-08-22 VITALS — DIASTOLIC BLOOD PRESSURE: 84 MMHG | RESPIRATION RATE: 21 BRPM | SYSTOLIC BLOOD PRESSURE: 158 MMHG

## 2024-08-22 VITALS — RESPIRATION RATE: 18 BRPM | SYSTOLIC BLOOD PRESSURE: 133 MMHG | DIASTOLIC BLOOD PRESSURE: 101 MMHG

## 2024-08-22 VITALS — RESPIRATION RATE: 16 BRPM

## 2024-08-22 DIAGNOSIS — J40: ICD-10-CM

## 2024-08-22 DIAGNOSIS — U07.1: Primary | ICD-10-CM

## 2024-08-22 DIAGNOSIS — F17.200: ICD-10-CM

## 2024-08-22 LAB
BUN SERPL-MCNC: 17 MG/DL (ref 9–20)
CALCIUM SERPL-MCNC: 8.2 MG/DL (ref 8.4–10.2)
CHLORIDE SERPL-SCNC: 102 MMOL/L (ref 98–107)
CO2 SERPL-SCNC: 28 MMOL/L (ref 22–30)
EGFR: > 60
ERYTHROCYTE [DISTWIDTH] IN BLOOD BY AUTOMATED COUNT: 14.5 % (ref 11.5–14.5)
GLUCOSE SERPL-MCNC: 93 MG/DL (ref 70–99)
HCT VFR BLD AUTO: 36.9 % (ref 39–52)
HGB BLD-MCNC: 12.5 G/DL (ref 13–18)
MCHC RBC AUTO-ENTMCNC: 33.9 G/DL (ref 33–37)
MCV RBC AUTO: 88.5 FL (ref 80–94)
NRBC BLD AUTO-RTO: 0 %
PLATELET # BLD AUTO: 261 10^3/UL (ref 130–400)
SODIUM SERPL-SCNC: 138 MMOL/L (ref 135–145)

## 2024-08-22 PROCEDURE — 94640 AIRWAY INHALATION TREATMENT: CPT

## 2024-08-22 PROCEDURE — 99284 EMERGENCY DEPT VISIT MOD MDM: CPT

## 2024-08-22 PROCEDURE — 96374 THER/PROPH/DIAG INJ IV PUSH: CPT

## 2024-08-22 RX ADMIN — IPRATROPIUM BROMIDE AND ALBUTEROL SULFATE 3 ML: 2.5; .5 SOLUTION RESPIRATORY (INHALATION) at 07:48

## 2024-08-22 RX ADMIN — DEXAMETHASONE SODIUM PHOSPHATE 10 MG: 4 INJECTION, SOLUTION INTRA-ARTICULAR; INTRALESIONAL; INTRAMUSCULAR; INTRAVENOUS; SOFT TISSUE at 09:27

## 2024-08-23 ENCOUNTER — HOSPITAL ENCOUNTER (INPATIENT)
Dept: HOSPITAL 99 - 2 NORTH | Age: 84
LOS: 1 days | Discharge: HOME | DRG: 391 | End: 2024-08-24
Payer: COMMERCIAL

## 2024-08-23 VITALS — RESPIRATION RATE: 20 BRPM

## 2024-08-23 VITALS — RESPIRATION RATE: 32 BRPM

## 2024-08-23 VITALS — RESPIRATION RATE: 16 BRPM | SYSTOLIC BLOOD PRESSURE: 84 MMHG | DIASTOLIC BLOOD PRESSURE: 66 MMHG

## 2024-08-23 VITALS — RESPIRATION RATE: 29 BRPM

## 2024-08-23 VITALS — DIASTOLIC BLOOD PRESSURE: 92 MMHG | OXYGEN SATURATION: 96 % | HEART RATE: 71 BPM | SYSTOLIC BLOOD PRESSURE: 109 MMHG

## 2024-08-23 VITALS — RESPIRATION RATE: 21 BRPM

## 2024-08-23 VITALS — BODY MASS INDEX: 18.5 KG/M2 | BODY MASS INDEX: 17.9 KG/M2 | BODY MASS INDEX: 18 KG/M2 | BODY MASS INDEX: 17.7 KG/M2

## 2024-08-23 VITALS — SYSTOLIC BLOOD PRESSURE: 109 MMHG | RESPIRATION RATE: 16 BRPM | DIASTOLIC BLOOD PRESSURE: 92 MMHG

## 2024-08-23 VITALS — RESPIRATION RATE: 17 BRPM

## 2024-08-23 VITALS — DIASTOLIC BLOOD PRESSURE: 94 MMHG | RESPIRATION RATE: 18 BRPM | SYSTOLIC BLOOD PRESSURE: 120 MMHG

## 2024-08-23 VITALS — RESPIRATION RATE: 22 BRPM

## 2024-08-23 VITALS — RESPIRATION RATE: 14 BRPM

## 2024-08-23 VITALS — SYSTOLIC BLOOD PRESSURE: 116 MMHG | DIASTOLIC BLOOD PRESSURE: 96 MMHG | RESPIRATION RATE: 24 BRPM

## 2024-08-23 VITALS — RESPIRATION RATE: 19 BRPM

## 2024-08-23 VITALS — RESPIRATION RATE: 25 BRPM

## 2024-08-23 VITALS — RESPIRATION RATE: 22 BRPM | DIASTOLIC BLOOD PRESSURE: 128 MMHG | SYSTOLIC BLOOD PRESSURE: 175 MMHG

## 2024-08-23 VITALS — RESPIRATION RATE: 21 BRPM | SYSTOLIC BLOOD PRESSURE: 146 MMHG | DIASTOLIC BLOOD PRESSURE: 97 MMHG

## 2024-08-23 VITALS — RESPIRATION RATE: 17 BRPM | DIASTOLIC BLOOD PRESSURE: 77 MMHG | SYSTOLIC BLOOD PRESSURE: 121 MMHG

## 2024-08-23 VITALS — DIASTOLIC BLOOD PRESSURE: 74 MMHG | SYSTOLIC BLOOD PRESSURE: 111 MMHG | RESPIRATION RATE: 20 BRPM

## 2024-08-23 VITALS — SYSTOLIC BLOOD PRESSURE: 127 MMHG | DIASTOLIC BLOOD PRESSURE: 95 MMHG | RESPIRATION RATE: 21 BRPM

## 2024-08-23 VITALS — DIASTOLIC BLOOD PRESSURE: 79 MMHG | RESPIRATION RATE: 20 BRPM | SYSTOLIC BLOOD PRESSURE: 115 MMHG

## 2024-08-23 VITALS — SYSTOLIC BLOOD PRESSURE: 120 MMHG | OXYGEN SATURATION: 97 % | DIASTOLIC BLOOD PRESSURE: 85 MMHG | HEART RATE: 67 BPM

## 2024-08-23 VITALS — SYSTOLIC BLOOD PRESSURE: 122 MMHG | RESPIRATION RATE: 14 BRPM | DIASTOLIC BLOOD PRESSURE: 77 MMHG

## 2024-08-23 VITALS — SYSTOLIC BLOOD PRESSURE: 108 MMHG | DIASTOLIC BLOOD PRESSURE: 88 MMHG | RESPIRATION RATE: 20 BRPM

## 2024-08-23 VITALS — RESPIRATION RATE: 23 BRPM

## 2024-08-23 VITALS — SYSTOLIC BLOOD PRESSURE: 147 MMHG | RESPIRATION RATE: 20 BRPM | DIASTOLIC BLOOD PRESSURE: 98 MMHG

## 2024-08-23 VITALS — RESPIRATION RATE: 18 BRPM

## 2024-08-23 VITALS — RESPIRATION RATE: 20 BRPM | DIASTOLIC BLOOD PRESSURE: 79 MMHG | SYSTOLIC BLOOD PRESSURE: 127 MMHG

## 2024-08-23 VITALS — RESPIRATION RATE: 16 BRPM | DIASTOLIC BLOOD PRESSURE: 83 MMHG | SYSTOLIC BLOOD PRESSURE: 112 MMHG | HEART RATE: 74 BPM

## 2024-08-23 VITALS — RESPIRATION RATE: 21 BRPM | SYSTOLIC BLOOD PRESSURE: 131 MMHG | DIASTOLIC BLOOD PRESSURE: 88 MMHG

## 2024-08-23 VITALS — DIASTOLIC BLOOD PRESSURE: 88 MMHG | SYSTOLIC BLOOD PRESSURE: 133 MMHG

## 2024-08-23 VITALS — SYSTOLIC BLOOD PRESSURE: 144 MMHG | DIASTOLIC BLOOD PRESSURE: 97 MMHG

## 2024-08-23 VITALS — DIASTOLIC BLOOD PRESSURE: 85 MMHG | SYSTOLIC BLOOD PRESSURE: 120 MMHG

## 2024-08-23 VITALS — RESPIRATION RATE: 27 BRPM

## 2024-08-23 VITALS — RESPIRATION RATE: 16 BRPM

## 2024-08-23 VITALS — RESPIRATION RATE: 13 BRPM

## 2024-08-23 VITALS — DIASTOLIC BLOOD PRESSURE: 82 MMHG | SYSTOLIC BLOOD PRESSURE: 117 MMHG | RESPIRATION RATE: 21 BRPM

## 2024-08-23 VITALS — RESPIRATION RATE: 15 BRPM

## 2024-08-23 DIAGNOSIS — Z96.642: ICD-10-CM

## 2024-08-23 DIAGNOSIS — Z88.1: ICD-10-CM

## 2024-08-23 DIAGNOSIS — G91.2: ICD-10-CM

## 2024-08-23 DIAGNOSIS — E78.00: ICD-10-CM

## 2024-08-23 DIAGNOSIS — K21.00: Primary | ICD-10-CM

## 2024-08-23 DIAGNOSIS — I10: ICD-10-CM

## 2024-08-23 DIAGNOSIS — Z87.19: ICD-10-CM

## 2024-08-23 DIAGNOSIS — J44.9: ICD-10-CM

## 2024-08-23 DIAGNOSIS — U07.1: ICD-10-CM

## 2024-08-23 DIAGNOSIS — Z88.0: ICD-10-CM

## 2024-08-23 DIAGNOSIS — Z86.73: ICD-10-CM

## 2024-08-23 DIAGNOSIS — Z91.013: ICD-10-CM

## 2024-08-23 DIAGNOSIS — F10.90: ICD-10-CM

## 2024-08-23 DIAGNOSIS — Z11.52: ICD-10-CM

## 2024-08-23 DIAGNOSIS — Z85.818: ICD-10-CM

## 2024-08-23 DIAGNOSIS — N40.0: ICD-10-CM

## 2024-08-23 DIAGNOSIS — Z85.51: ICD-10-CM

## 2024-08-23 DIAGNOSIS — F17.210: ICD-10-CM

## 2024-08-23 DIAGNOSIS — I25.10: ICD-10-CM

## 2024-08-23 DIAGNOSIS — Z60.2: ICD-10-CM

## 2024-08-23 DIAGNOSIS — Z88.2: ICD-10-CM

## 2024-08-23 DIAGNOSIS — R13.10: ICD-10-CM

## 2024-08-23 DIAGNOSIS — R13.12: ICD-10-CM

## 2024-08-23 DIAGNOSIS — Z98.2: ICD-10-CM

## 2024-08-23 DIAGNOSIS — Z86.16: ICD-10-CM

## 2024-08-23 DIAGNOSIS — Z85.810: ICD-10-CM

## 2024-08-23 DIAGNOSIS — R47.1: ICD-10-CM

## 2024-08-23 LAB
ALBUMIN SERPL-MCNC: 4.1 G/DL (ref 3.5–5)
ALP SERPL-CCNC: 78 U/L (ref 38–126)
ALT SERPL-CCNC: 13 U/L (ref 0–50)
AST SERPL-CCNC: 23 U/L (ref 17–59)
BUN SERPL-MCNC: 18 MG/DL (ref 9–20)
BUN SERPL-MCNC: 19 MG/DL (ref 9–20)
CALCIUM SERPL-MCNC: 8.1 MG/DL (ref 8.4–10.2)
CALCIUM SERPL-MCNC: 8.6 MG/DL (ref 8.4–10.2)
CHLORIDE SERPL-SCNC: 100 MMOL/L (ref 98–107)
CHLORIDE SERPL-SCNC: 97 MMOL/L (ref 98–107)
CO2 SERPL-SCNC: 27 MMOL/L (ref 22–30)
CO2 SERPL-SCNC: 28 MMOL/L (ref 22–30)
EGFR: > 60
EGFR: > 60
ERYTHROCYTE [DISTWIDTH] IN BLOOD BY AUTOMATED COUNT: 14.6 % (ref 11.5–14.5)
ERYTHROCYTE [DISTWIDTH] IN BLOOD BY AUTOMATED COUNT: 14.6 % (ref 11.5–14.5)
ESTIMATED CREATININE CLEARANCE: 45 ML/MIN
ESTIMATED CREATININE CLEARANCE: 49 ML/MIN
GLUCOSE SERPL-MCNC: 119 MG/DL (ref 70–99)
GLUCOSE SERPL-MCNC: 85 MG/DL (ref 70–99)
HCT VFR BLD AUTO: 33.2 % (ref 39–52)
HCT VFR BLD AUTO: 35 % (ref 39–52)
HGB BLD-MCNC: 11.5 G/DL (ref 13–18)
HGB BLD-MCNC: 12.2 G/DL (ref 13–18)
MCHC RBC AUTO-ENTMCNC: 34.6 G/DL (ref 33–37)
MCHC RBC AUTO-ENTMCNC: 34.9 G/DL (ref 33–37)
MCV RBC AUTO: 85.6 FL (ref 80–94)
MCV RBC AUTO: 86.2 FL (ref 80–94)
NRBC BLD AUTO-RTO: 0 %
PLATELET # BLD AUTO: 289 10^3/UL (ref 130–400)
POTASSIUM SERPL-SCNC: 4.3 MMOL/L (ref 3.5–5.1)
PROT SERPL-MCNC: 6.6 G/DL (ref 6.3–8.2)
SODIUM SERPL-SCNC: 137 MMOL/L (ref 135–145)
SODIUM SERPL-SCNC: 138 MMOL/L (ref 135–145)

## 2024-08-23 RX ADMIN — CALCIUM GLUCONATE 100: 10 INJECTION, SOLUTION INTRAVENOUS at 21:06

## 2024-08-23 RX ADMIN — SUCRALFATE 1 GRAM: 1 TABLET ORAL at 09:14

## 2024-08-23 RX ADMIN — PANTOPRAZOLE SODIUM 40 MG: 40 INJECTION, POWDER, LYOPHILIZED, FOR SOLUTION INTRAVENOUS at 21:05

## 2024-08-23 RX ADMIN — SODIUM CHLORIDE 10 ML: 9 INJECTION, SOLUTION INTRAMUSCULAR; INTRAVENOUS; SUBCUTANEOUS at 14:39

## 2024-08-23 RX ADMIN — GUAIFENESIN 1200 MG: 600 TABLET, EXTENDED RELEASE ORAL at 09:15

## 2024-08-23 RX ADMIN — SODIUM CHLORIDE 1000: 900 INJECTION, SOLUTION INTRAVENOUS at 18:38

## 2024-08-23 RX ADMIN — GUAIFENESIN 1200 MG: 600 TABLET, EXTENDED RELEASE ORAL at 21:05

## 2024-08-23 RX ADMIN — SODIUM CHLORIDE 1000: 900 INJECTION, SOLUTION INTRAVENOUS at 03:46

## 2024-08-23 RX ADMIN — SUCRALFATE 1 GRAM: 1 TABLET ORAL at 21:08

## 2024-08-23 RX ADMIN — SUCRALFATE 1 GRAM: 1 TABLET ORAL at 15:50

## 2024-08-23 RX ADMIN — PANTOPRAZOLE SODIUM 40 MG: 40 INJECTION, POWDER, LYOPHILIZED, FOR SOLUTION INTRAVENOUS at 09:14

## 2024-08-23 RX ADMIN — SUCRALFATE: 1 TABLET ORAL at 14:50

## 2024-08-23 RX ADMIN — TAMSULOSIN HYDROCHLORIDE 0.4 MG: 0.4 CAPSULE ORAL at 09:15

## 2024-08-23 RX ADMIN — SODIUM CHLORIDE 10 ML: 9 INJECTION, SOLUTION INTRAMUSCULAR; INTRAVENOUS; SUBCUTANEOUS at 21:06

## 2024-08-23 SDOH — SOCIAL STABILITY - SOCIAL INSECURITY: PROBLEMS RELATED TO LIVING ALONE: Z60.2

## 2024-08-24 VITALS — RESPIRATION RATE: 18 BRPM | SYSTOLIC BLOOD PRESSURE: 125 MMHG | DIASTOLIC BLOOD PRESSURE: 87 MMHG

## 2024-08-24 VITALS — HEART RATE: 85 BPM | SYSTOLIC BLOOD PRESSURE: 128 MMHG | DIASTOLIC BLOOD PRESSURE: 74 MMHG

## 2024-08-24 VITALS — RESPIRATION RATE: 18 BRPM | SYSTOLIC BLOOD PRESSURE: 124 MMHG | DIASTOLIC BLOOD PRESSURE: 67 MMHG

## 2024-08-24 LAB
ALBUMIN SERPL-MCNC: 3.3 G/DL (ref 3.5–5)
ALP SERPL-CCNC: 77 U/L (ref 38–126)
ALT SERPL-CCNC: < 10 U/L (ref 0–50)
AST SERPL-CCNC: 17 U/L (ref 17–59)
BUN SERPL-MCNC: 12 MG/DL (ref 9–20)
CALCIUM SERPL-MCNC: 8 MG/DL (ref 8.4–10.2)
CHLORIDE SERPL-SCNC: 105 MMOL/L (ref 98–107)
CO2 SERPL-SCNC: 24 MMOL/L (ref 22–30)
EGFR: > 60
ERYTHROCYTE [DISTWIDTH] IN BLOOD BY AUTOMATED COUNT: 14.6 % (ref 11.5–14.5)
ESTIMATED CREATININE CLEARANCE: 53 ML/MIN
GLUCOSE SERPL-MCNC: 78 MG/DL (ref 70–99)
HCT VFR BLD AUTO: 33.2 % (ref 39–52)
HGB BLD-MCNC: 11.1 G/DL (ref 13–18)
MCHC RBC AUTO-ENTMCNC: 33.4 G/DL (ref 33–37)
MCV RBC AUTO: 87.8 FL (ref 80–94)
NRBC BLD AUTO-RTO: 0 %
PLATELET # BLD AUTO: 252 10^3/UL (ref 130–400)
POTASSIUM SERPL-SCNC: 3.9 MMOL/L (ref 3.5–5.1)
PROT SERPL-MCNC: 5.5 G/DL (ref 6.3–8.2)
SODIUM SERPL-SCNC: 138 MMOL/L (ref 135–145)

## 2024-08-24 RX ADMIN — SODIUM CHLORIDE: 900 INJECTION, SOLUTION INTRAVENOUS at 09:08

## 2024-08-24 RX ADMIN — GUAIFENESIN 1200 MG: 600 TABLET, EXTENDED RELEASE ORAL at 09:08

## 2024-08-24 RX ADMIN — SUCRALFATE: 1 TABLET ORAL at 11:55

## 2024-08-24 RX ADMIN — PANTOPRAZOLE SODIUM 40 MG: 40 INJECTION, POWDER, LYOPHILIZED, FOR SOLUTION INTRAVENOUS at 09:08

## 2024-08-24 RX ADMIN — SUCRALFATE 1 GRAM: 1 TABLET ORAL at 09:08

## 2024-08-24 RX ADMIN — TAMSULOSIN HYDROCHLORIDE 0.4 MG: 0.4 CAPSULE ORAL at 09:08

## 2024-08-24 RX ADMIN — SODIUM CHLORIDE 10 ML: 9 INJECTION, SOLUTION INTRAMUSCULAR; INTRAVENOUS; SUBCUTANEOUS at 09:08

## 2024-08-24 RX ADMIN — SODIUM CHLORIDE 1000: 900 INJECTION, SOLUTION INTRAVENOUS at 03:02

## 2024-10-14 ENCOUNTER — TELEPHONE (OUTPATIENT)
Age: 84
End: 2024-10-14

## 2024-10-14 NOTE — TELEPHONE ENCOUNTER
Caller: Peter    Doctor: Elodia    Reason for call: Patient was trying to find where he had his RADHA 6 months ago. He thought it was with [facility]. It was with DR Chris Herrera with [facility]    Call back#:

## 2024-10-14 NOTE — TELEPHONE ENCOUNTER
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 Connection lost during call    Call back#: [phone]

## 2024-12-19 ENCOUNTER — HOSPITAL ENCOUNTER (EMERGENCY)
Dept: HOSPITAL 99 - EMR | Age: 84
Discharge: HOME | End: 2024-12-19
Payer: COMMERCIAL

## 2024-12-19 VITALS — RESPIRATION RATE: 34 BRPM

## 2024-12-19 VITALS — SYSTOLIC BLOOD PRESSURE: 109 MMHG | RESPIRATION RATE: 24 BRPM | DIASTOLIC BLOOD PRESSURE: 81 MMHG

## 2024-12-19 VITALS — RESPIRATION RATE: 14 BRPM

## 2024-12-19 VITALS — DIASTOLIC BLOOD PRESSURE: 97 MMHG | SYSTOLIC BLOOD PRESSURE: 125 MMHG | RESPIRATION RATE: 17 BRPM

## 2024-12-19 VITALS — DIASTOLIC BLOOD PRESSURE: 111 MMHG | SYSTOLIC BLOOD PRESSURE: 148 MMHG | RESPIRATION RATE: 17 BRPM

## 2024-12-19 VITALS — RESPIRATION RATE: 21 BRPM

## 2024-12-19 VITALS — RESPIRATION RATE: 18 BRPM

## 2024-12-19 VITALS — RESPIRATION RATE: 29 BRPM

## 2024-12-19 VITALS — RESPIRATION RATE: 22 BRPM | DIASTOLIC BLOOD PRESSURE: 63 MMHG | SYSTOLIC BLOOD PRESSURE: 103 MMHG

## 2024-12-19 VITALS — RESPIRATION RATE: 20 BRPM

## 2024-12-19 VITALS — RESPIRATION RATE: 21 BRPM | SYSTOLIC BLOOD PRESSURE: 141 MMHG | DIASTOLIC BLOOD PRESSURE: 106 MMHG

## 2024-12-19 VITALS — RESPIRATION RATE: 38 BRPM

## 2024-12-19 VITALS — RESPIRATION RATE: 16 BRPM

## 2024-12-19 VITALS — RESPIRATION RATE: 17 BRPM

## 2024-12-19 VITALS — RESPIRATION RATE: 19 BRPM

## 2024-12-19 VITALS — BODY MASS INDEX: 19.1 KG/M2

## 2024-12-19 DIAGNOSIS — Z98.2: ICD-10-CM

## 2024-12-19 DIAGNOSIS — Z85.810: ICD-10-CM

## 2024-12-19 DIAGNOSIS — F10.10: ICD-10-CM

## 2024-12-19 DIAGNOSIS — Z86.718: ICD-10-CM

## 2024-12-19 DIAGNOSIS — E78.00: ICD-10-CM

## 2024-12-19 DIAGNOSIS — Z79.899: ICD-10-CM

## 2024-12-19 DIAGNOSIS — R11.2: Primary | ICD-10-CM

## 2024-12-19 DIAGNOSIS — I69.928: ICD-10-CM

## 2024-12-19 DIAGNOSIS — F17.210: ICD-10-CM

## 2024-12-19 DIAGNOSIS — E87.6: ICD-10-CM

## 2024-12-19 DIAGNOSIS — Z95.5: ICD-10-CM

## 2024-12-19 DIAGNOSIS — Z85.51: ICD-10-CM

## 2024-12-19 DIAGNOSIS — J44.9: ICD-10-CM

## 2024-12-19 DIAGNOSIS — Z85.818: ICD-10-CM

## 2024-12-19 DIAGNOSIS — K21.9: ICD-10-CM

## 2024-12-19 DIAGNOSIS — I10: ICD-10-CM

## 2024-12-19 DIAGNOSIS — Z85.828: ICD-10-CM

## 2024-12-19 LAB
ALBUMIN SERPL-MCNC: 4.6 G/DL (ref 3.5–5)
ALP SERPL-CCNC: 71 U/L (ref 38–126)
ALT SERPL-CCNC: 14 U/L (ref 0–50)
AST SERPL-CCNC: 22 U/L (ref 17–59)
BUN SERPL-MCNC: 16 MG/DL (ref 9–20)
CALCIUM SERPL-MCNC: 8.8 MG/DL (ref 8.4–10.2)
CHLORIDE SERPL-SCNC: 91 MMOL/L (ref 98–107)
CO2 SERPL-SCNC: 32 MMOL/L (ref 22–30)
EGFR: > 60
ERYTHROCYTE [DISTWIDTH] IN BLOOD BY AUTOMATED COUNT: 14.6 % (ref 11.5–14.5)
ESTIMATED CREATININE CLEARANCE: 42 ML/MIN
GLUCOSE SERPL-MCNC: 101 MG/DL (ref 70–99)
HCT VFR BLD AUTO: 38.2 % (ref 39–52)
HGB BLD-MCNC: 12.7 G/DL (ref 13–18)
LIPASE SERPL-CCNC: 188 U/L (ref 23–300)
MCHC RBC AUTO-ENTMCNC: 33.2 G/DL (ref 33–37)
MCV RBC AUTO: 94.3 FL (ref 80–94)
NRBC BLD AUTO-RTO: 0 %
PLATELET # BLD AUTO: 244 10^3/UL (ref 130–400)
POTASSIUM SERPL-SCNC: 3.2 MMOL/L (ref 3.5–5.1)
PROT SERPL-MCNC: 7.3 G/DL (ref 6.3–8.2)
SODIUM SERPL-SCNC: 133 MMOL/L (ref 135–145)
TROPONIN I SERPL-MCNC: < 0.012 NG/ML

## 2024-12-19 PROCEDURE — 96375 TX/PRO/DX INJ NEW DRUG ADDON: CPT

## 2024-12-19 PROCEDURE — 96374 THER/PROPH/DIAG INJ IV PUSH: CPT

## 2024-12-19 PROCEDURE — 96361 HYDRATE IV INFUSION ADD-ON: CPT

## 2024-12-19 PROCEDURE — 99285 EMERGENCY DEPT VISIT HI MDM: CPT

## 2024-12-19 RX ADMIN — ONDANSETRON HYDROCHLORIDE 4 MG: 2 SOLUTION INTRAMUSCULAR; INTRAVENOUS at 01:34

## 2024-12-19 RX ADMIN — PANTOPRAZOLE SODIUM 40 MG: 40 INJECTION, POWDER, LYOPHILIZED, FOR SOLUTION INTRAVENOUS at 01:53

## 2024-12-19 RX ADMIN — SODIUM CHLORIDE 1000: 900 INJECTION, SOLUTION INTRAVENOUS at 01:53

## 2024-12-19 RX ADMIN — METOCLOPRAMIDE HYDROCHLORIDE 10 MG: 5 INJECTION INTRAMUSCULAR; INTRAVENOUS at 02:27

## 2025-01-26 ENCOUNTER — HOSPITAL ENCOUNTER (EMERGENCY)
Dept: HOSPITAL 99 - EMR | Age: 85
LOS: 1 days | Discharge: HOME | End: 2025-01-27
Payer: MEDICARE

## 2025-01-26 VITALS — DIASTOLIC BLOOD PRESSURE: 95 MMHG | RESPIRATION RATE: 12 BRPM | SYSTOLIC BLOOD PRESSURE: 155 MMHG

## 2025-01-26 VITALS — BODY MASS INDEX: 19.5 KG/M2

## 2025-01-26 DIAGNOSIS — K21.9: ICD-10-CM

## 2025-01-26 DIAGNOSIS — Z86.73: ICD-10-CM

## 2025-01-26 DIAGNOSIS — I10: Primary | ICD-10-CM

## 2025-01-26 DIAGNOSIS — Z79.899: ICD-10-CM

## 2025-01-26 DIAGNOSIS — F17.200: ICD-10-CM

## 2025-01-26 DIAGNOSIS — R07.9: ICD-10-CM

## 2025-01-26 DIAGNOSIS — G91.9: ICD-10-CM

## 2025-01-26 DIAGNOSIS — Z86.718: ICD-10-CM

## 2025-01-26 DIAGNOSIS — J44.9: ICD-10-CM

## 2025-01-26 DIAGNOSIS — Z85.828: ICD-10-CM

## 2025-01-26 DIAGNOSIS — Z98.2: ICD-10-CM

## 2025-01-26 DIAGNOSIS — E78.00: ICD-10-CM

## 2025-01-26 PROCEDURE — 99284 EMERGENCY DEPT VISIT MOD MDM: CPT

## 2025-01-27 VITALS — RESPIRATION RATE: 18 BRPM | DIASTOLIC BLOOD PRESSURE: 107 MMHG | SYSTOLIC BLOOD PRESSURE: 140 MMHG

## 2025-01-27 VITALS — RESPIRATION RATE: 19 BRPM

## 2025-01-27 VITALS — RESPIRATION RATE: 18 BRPM

## 2025-01-27 VITALS — RESPIRATION RATE: 17 BRPM

## 2025-01-27 VITALS — DIASTOLIC BLOOD PRESSURE: 107 MMHG | RESPIRATION RATE: 19 BRPM | SYSTOLIC BLOOD PRESSURE: 134 MMHG

## 2025-01-27 VITALS — SYSTOLIC BLOOD PRESSURE: 159 MMHG | RESPIRATION RATE: 16 BRPM | DIASTOLIC BLOOD PRESSURE: 104 MMHG

## 2025-01-27 VITALS — RESPIRATION RATE: 19 BRPM | DIASTOLIC BLOOD PRESSURE: 100 MMHG | SYSTOLIC BLOOD PRESSURE: 140 MMHG

## 2025-01-27 VITALS — RESPIRATION RATE: 16 BRPM

## 2025-01-27 VITALS — RESPIRATION RATE: 17 BRPM | SYSTOLIC BLOOD PRESSURE: 171 MMHG | DIASTOLIC BLOOD PRESSURE: 108 MMHG

## 2025-01-27 VITALS — DIASTOLIC BLOOD PRESSURE: 117 MMHG | SYSTOLIC BLOOD PRESSURE: 144 MMHG | RESPIRATION RATE: 20 BRPM

## 2025-01-27 VITALS — DIASTOLIC BLOOD PRESSURE: 102 MMHG | SYSTOLIC BLOOD PRESSURE: 160 MMHG

## 2025-01-27 VITALS — RESPIRATION RATE: 22 BRPM

## 2025-01-27 VITALS — RESPIRATION RATE: 13 BRPM

## 2025-01-27 VITALS — RESPIRATION RATE: 20 BRPM

## 2025-01-27 VITALS — DIASTOLIC BLOOD PRESSURE: 112 MMHG | SYSTOLIC BLOOD PRESSURE: 144 MMHG | RESPIRATION RATE: 14 BRPM

## 2025-01-27 VITALS — DIASTOLIC BLOOD PRESSURE: 106 MMHG | SYSTOLIC BLOOD PRESSURE: 130 MMHG

## 2025-01-27 VITALS — RESPIRATION RATE: 15 BRPM

## 2025-01-27 VITALS — RESPIRATION RATE: 17 BRPM | DIASTOLIC BLOOD PRESSURE: 99 MMHG | SYSTOLIC BLOOD PRESSURE: 138 MMHG

## 2025-01-27 VITALS — RESPIRATION RATE: 21 BRPM

## 2025-01-27 VITALS — RESPIRATION RATE: 14 BRPM

## 2025-01-27 VITALS — RESPIRATION RATE: 24 BRPM

## 2025-01-27 LAB
ALBUMIN SERPL-MCNC: 4.4 G/DL (ref 3.5–5)
ALP SERPL-CCNC: 82 U/L (ref 38–126)
ALT SERPL-CCNC: 11 U/L (ref 0–50)
AST SERPL-CCNC: 24 U/L (ref 17–59)
BUN SERPL-MCNC: 14 MG/DL (ref 9–20)
CALCIUM SERPL-MCNC: 8.9 MG/DL (ref 8.4–10.2)
CHLORIDE SERPL-SCNC: 97 MMOL/L (ref 98–107)
CO2 SERPL-SCNC: 29 MMOL/L (ref 22–30)
EGFR: > 60
ERYTHROCYTE [DISTWIDTH] IN BLOOD BY AUTOMATED COUNT: 15.3 % (ref 11.5–14.5)
ESTIMATED CREATININE CLEARANCE: 51 ML/MIN
GLUCOSE SERPL-MCNC: 77 MG/DL (ref 70–99)
HCT VFR BLD AUTO: 35.6 % (ref 39–52)
HGB BLD-MCNC: 12 G/DL (ref 13–18)
MCHC RBC AUTO-ENTMCNC: 33.7 G/DL (ref 33–37)
MCV RBC AUTO: 94.7 FL (ref 80–94)
NRBC BLD AUTO-RTO: 0 %
PLATELET # BLD AUTO: 263 10^3/UL (ref 130–400)
POTASSIUM SERPL-SCNC: 4 MMOL/L (ref 3.5–5.1)
PROT SERPL-MCNC: 7.2 G/DL (ref 6.3–8.2)
SODIUM SERPL-SCNC: 136 MMOL/L (ref 135–145)
TROPONIN I SERPL-MCNC: 0.01 NG/ML
TROPONIN I SERPL-MCNC: 0.02 NG/ML
TROPONIN I SERPL-MCNC: < 0.012 NG/ML

## 2025-02-12 ENCOUNTER — HOSPITAL ENCOUNTER (EMERGENCY)
Dept: HOSPITAL 99 - EMR | Age: 85
Discharge: HOME | End: 2025-02-12
Payer: COMMERCIAL

## 2025-02-12 VITALS — RESPIRATION RATE: 16 BRPM

## 2025-02-12 VITALS — DIASTOLIC BLOOD PRESSURE: 72 MMHG | SYSTOLIC BLOOD PRESSURE: 128 MMHG | RESPIRATION RATE: 20 BRPM

## 2025-02-12 VITALS — RESPIRATION RATE: 20 BRPM

## 2025-02-12 VITALS — RESPIRATION RATE: 19 BRPM

## 2025-02-12 VITALS — RESPIRATION RATE: 17 BRPM | SYSTOLIC BLOOD PRESSURE: 118 MMHG | DIASTOLIC BLOOD PRESSURE: 82 MMHG

## 2025-02-12 VITALS — SYSTOLIC BLOOD PRESSURE: 132 MMHG | DIASTOLIC BLOOD PRESSURE: 98 MMHG | RESPIRATION RATE: 14 BRPM

## 2025-02-12 VITALS — RESPIRATION RATE: 15 BRPM

## 2025-02-12 VITALS — RESPIRATION RATE: 17 BRPM

## 2025-02-12 VITALS — SYSTOLIC BLOOD PRESSURE: 138 MMHG | DIASTOLIC BLOOD PRESSURE: 98 MMHG

## 2025-02-12 VITALS — SYSTOLIC BLOOD PRESSURE: 134 MMHG | DIASTOLIC BLOOD PRESSURE: 78 MMHG | RESPIRATION RATE: 18 BRPM

## 2025-02-12 DIAGNOSIS — Z98.2: ICD-10-CM

## 2025-02-12 DIAGNOSIS — J44.9: ICD-10-CM

## 2025-02-12 DIAGNOSIS — Z86.718: ICD-10-CM

## 2025-02-12 DIAGNOSIS — Z85.818: ICD-10-CM

## 2025-02-12 DIAGNOSIS — I10: ICD-10-CM

## 2025-02-12 DIAGNOSIS — Z87.440: ICD-10-CM

## 2025-02-12 DIAGNOSIS — K21.00: ICD-10-CM

## 2025-02-12 DIAGNOSIS — Z87.19: ICD-10-CM

## 2025-02-12 DIAGNOSIS — F10.10: ICD-10-CM

## 2025-02-12 DIAGNOSIS — Z85.828: ICD-10-CM

## 2025-02-12 DIAGNOSIS — Z85.810: ICD-10-CM

## 2025-02-12 DIAGNOSIS — F17.200: ICD-10-CM

## 2025-02-12 DIAGNOSIS — E78.00: ICD-10-CM

## 2025-02-12 DIAGNOSIS — I73.00: ICD-10-CM

## 2025-02-12 DIAGNOSIS — Z82.49: ICD-10-CM

## 2025-02-12 DIAGNOSIS — Z90.79: ICD-10-CM

## 2025-02-12 DIAGNOSIS — Z86.73: ICD-10-CM

## 2025-02-12 DIAGNOSIS — M54.9: Primary | ICD-10-CM

## 2025-02-12 DIAGNOSIS — Z95.5: ICD-10-CM

## 2025-02-12 LAB
ALBUMIN SERPL-MCNC: 3.8 G/DL (ref 3.5–5)
ALP SERPL-CCNC: 66 U/L (ref 38–126)
ALT SERPL-CCNC: 11 U/L (ref 0–50)
AST SERPL-CCNC: 25 U/L (ref 17–59)
BUN SERPL-MCNC: 14 MG/DL (ref 9–20)
CALCIUM SERPL-MCNC: 8.4 MG/DL (ref 8.4–10.2)
CHLORIDE SERPL-SCNC: 105 MMOL/L (ref 98–107)
CO2 SERPL-SCNC: 23 MMOL/L (ref 22–30)
EGFR: > 60
ERYTHROCYTE [DISTWIDTH] IN BLOOD BY AUTOMATED COUNT: 15.4 % (ref 11.5–14.5)
GLUCOSE SERPL-MCNC: 78 MG/DL (ref 70–99)
HCT VFR BLD AUTO: 38.1 % (ref 39–52)
HGB BLD-MCNC: 12.9 G/DL (ref 13–18)
MCHC RBC AUTO-ENTMCNC: 33.9 G/DL (ref 33–37)
MCV RBC AUTO: 94.1 FL (ref 80–94)
NRBC BLD AUTO-RTO: 0 %
PLATELET # BLD AUTO: 240 10^3/UL (ref 130–400)
POTASSIUM SERPL-SCNC: 3.9 MMOL/L (ref 3.5–5.1)
PROT SERPL-MCNC: 6.3 G/DL (ref 6.3–8.2)
SODIUM SERPL-SCNC: 138 MMOL/L (ref 135–145)

## 2025-02-12 PROCEDURE — 96375 TX/PRO/DX INJ NEW DRUG ADDON: CPT

## 2025-02-12 PROCEDURE — 99284 EMERGENCY DEPT VISIT MOD MDM: CPT

## 2025-02-12 PROCEDURE — 96374 THER/PROPH/DIAG INJ IV PUSH: CPT

## 2025-02-12 PROCEDURE — 96361 HYDRATE IV INFUSION ADD-ON: CPT

## 2025-02-12 RX ADMIN — LIDOCAINE 1 PATCH: 4 PATCH TOPICAL at 14:08

## 2025-02-12 RX ADMIN — KETOROLAC TROMETHAMINE 15 MG: 15 INJECTION, SOLUTION INTRAMUSCULAR; INTRAVENOUS at 14:09

## 2025-02-12 RX ADMIN — SODIUM CHLORIDE 1000: 900 INJECTION, SOLUTION INTRAVENOUS at 09:50

## 2025-02-12 RX ADMIN — MORPHINE SULFATE 4 MG: 4 INJECTION INTRAVENOUS at 09:50

## 2025-02-17 ENCOUNTER — HOSPITAL ENCOUNTER (EMERGENCY)
Dept: HOSPITAL 99 - EMR | Age: 85
Discharge: HOME | End: 2025-02-17
Payer: COMMERCIAL

## 2025-02-17 VITALS — DIASTOLIC BLOOD PRESSURE: 112 MMHG | SYSTOLIC BLOOD PRESSURE: 151 MMHG

## 2025-02-17 VITALS — RESPIRATION RATE: 18 BRPM | DIASTOLIC BLOOD PRESSURE: 100 MMHG | SYSTOLIC BLOOD PRESSURE: 133 MMHG

## 2025-02-17 VITALS — SYSTOLIC BLOOD PRESSURE: 184 MMHG | DIASTOLIC BLOOD PRESSURE: 125 MMHG

## 2025-02-17 VITALS — DIASTOLIC BLOOD PRESSURE: 122 MMHG | SYSTOLIC BLOOD PRESSURE: 154 MMHG

## 2025-02-17 VITALS — SYSTOLIC BLOOD PRESSURE: 92 MMHG | DIASTOLIC BLOOD PRESSURE: 72 MMHG

## 2025-02-17 VITALS — DIASTOLIC BLOOD PRESSURE: 117 MMHG | SYSTOLIC BLOOD PRESSURE: 160 MMHG

## 2025-02-17 DIAGNOSIS — I10: ICD-10-CM

## 2025-02-17 DIAGNOSIS — F17.200: ICD-10-CM

## 2025-02-17 DIAGNOSIS — R33.9: Primary | ICD-10-CM

## 2025-02-17 DIAGNOSIS — E78.00: ICD-10-CM

## 2025-02-17 LAB
ALBUMIN SERPL-MCNC: 3.7 G/DL (ref 3.5–5)
ALP SERPL-CCNC: 65 U/L (ref 38–126)
ALT SERPL-CCNC: 11 U/L (ref 0–50)
AST SERPL-CCNC: 19 U/L (ref 17–59)
BUN SERPL-MCNC: 11 MG/DL (ref 9–20)
CALCIUM SERPL-MCNC: 8 MG/DL (ref 8.4–10.2)
CHLORIDE SERPL-SCNC: 100 MMOL/L (ref 98–107)
CO2 SERPL-SCNC: 27 MMOL/L (ref 22–30)
EGFR: > 60
ERYTHROCYTE [DISTWIDTH] IN BLOOD BY AUTOMATED COUNT: 15.2 % (ref 11.5–14.5)
GLUCOSE SERPL-MCNC: 93 MG/DL (ref 70–99)
HCT VFR BLD AUTO: 33.6 % (ref 39–52)
HGB BLD-MCNC: 11.3 G/DL (ref 13–18)
LIPASE SERPL-CCNC: 93 U/L (ref 23–300)
MCHC RBC AUTO-ENTMCNC: 33.6 G/DL (ref 33–37)
MCV RBC AUTO: 95.5 FL (ref 80–94)
NRBC BLD AUTO-RTO: 0 %
PLATELET # BLD AUTO: 211 10^3/UL (ref 130–400)
POTASSIUM SERPL-SCNC: 3.6 MMOL/L (ref 3.5–5.1)
PROT SERPL-MCNC: 6.3 G/DL (ref 6.3–8.2)
SODIUM SERPL-SCNC: 134 MMOL/L (ref 135–145)

## 2025-02-17 PROCEDURE — 51798 US URINE CAPACITY MEASURE: CPT

## 2025-02-17 PROCEDURE — 99283 EMERGENCY DEPT VISIT LOW MDM: CPT

## 2025-02-17 PROCEDURE — 51702 INSERT TEMP BLADDER CATH: CPT

## 2025-02-17 RX ADMIN — CLONIDINE HYDROCHLORIDE 0.05 MG: 0.1 TABLET ORAL at 21:28

## 2025-02-18 ENCOUNTER — HOSPITAL ENCOUNTER (OUTPATIENT)
Dept: HOSPITAL 99 - HWRAD | Age: 85
End: 2025-02-18
Payer: COMMERCIAL

## 2025-02-18 DIAGNOSIS — I10: ICD-10-CM

## 2025-02-18 DIAGNOSIS — I73.9: Primary | ICD-10-CM

## 2025-06-10 VITALS — SYSTOLIC BLOOD PRESSURE: 114 MMHG | DIASTOLIC BLOOD PRESSURE: 79 MMHG

## 2025-06-10 VITALS — SYSTOLIC BLOOD PRESSURE: 147 MMHG | DIASTOLIC BLOOD PRESSURE: 97 MMHG

## 2025-06-10 VITALS — DIASTOLIC BLOOD PRESSURE: 91 MMHG | SYSTOLIC BLOOD PRESSURE: 138 MMHG

## 2025-06-10 LAB
ALBUMIN SERPL-MCNC: 4.2 G/DL (ref 3.5–5)
ALP SERPL-CCNC: 65 U/L (ref 38–126)
ALT SERPL-CCNC: 12 U/L (ref 0–50)
AST SERPL-CCNC: 22 U/L (ref 17–59)
BASOPHILS # BLD AUTO: 0 10^3/UL (ref 0–0.2)
BASOPHILS NFR BLD AUTO: 0.3 % (ref 0–2)
BILIRUB SERPL-MCNC: 0.7 MG/DL (ref 0.2–1.3)
BUN SERPL-MCNC: 14 MG/DL (ref 9–20)
CALCIUM SERPL-MCNC: 6.2 MG/DL (ref 8.4–10.2)
CHLORIDE SERPL-SCNC: 104 MMOL/L (ref 98–107)
CO2 SERPL-SCNC: 26 MMOL/L (ref 22–30)
COMMENT: (no result)
CREAT SERPL-MCNC: 1 MG/DL (ref 0.7–1.3)
EGFR: > 60
EOSINOPHIL # BLD AUTO: 0 10^3/UL (ref 0–0.7)
EOSINOPHIL NFR BLD AUTO: 0.2 % (ref 0–6)
ERYTHROCYTE [DISTWIDTH] IN BLOOD BY AUTOMATED COUNT: 13.8 % (ref 11.5–14.5)
ESTIMATED CREATININE CLEARANCE: (no result) ML/MIN
ETHANOL SERPL-MCNC: (no result) MG/DL
GLUCOSE SERPL-MCNC: 86 MG/DL (ref 70–99)
HCT VFR BLD AUTO: 37.1 % (ref 39–52)
HGB BLD-MCNC: 12.6 G/DL (ref 13–18)
IMM GRANULOCYTES # BLD AUTO: 0 10^3/UL (ref 0–0.05)
IMM GRANULOCYTES NFR BLD AUTO: 0.3 % (ref 0–0.5)
LYMPHOCYTES # BLD AUTO: 0.6 10^3/UL (ref 1.2–3.4)
LYMPHOCYTES NFR BLD AUTO: 10 % (ref 20.5–51.1)
MCH RBC QN AUTO: 31.4 PG (ref 27–31)
MCHC RBC AUTO-ENTMCNC: 34 G/DL (ref 33–37)
MCV RBC AUTO: 92.5 FL (ref 80–94)
MONOCYTES # BLD AUTO: 0.8 10^3/UL (ref 0.1–0.6)
MONOCYTES NFR BLD AUTO: 12.3 % (ref 1.7–9.3)
NEUTROPHILS # BLD AUTO: 4.7 10^3/UL (ref 1.4–6.5)
NEUTROPHILS NFR BLD AUTO: 76.9 % (ref 42.2–75.2)
NRBC BLD AUTO-RTO: 0 %
PHOSPHATE SERPL-MCNC: 4.2 MG/DL (ref 2.5–4.5)
PLATELET # BLD AUTO: 279 10^3/UL (ref 130–400)
PMV BLD AUTO: 9.1 FL (ref 7.4–10.4)
POTASSIUM SERPL-SCNC: 3.9 MMOL/L (ref 3.5–5.1)
PROT SERPL-MCNC: 7 G/DL (ref 6.3–8.2)
RBC # BLD AUTO: 4.01 10^6/UL (ref 4.7–6.1)
SODIUM SERPL-SCNC: 139 MMOL/L (ref 135–145)
WBC # BLD AUTO: 6.1 10^3/UL (ref 4.8–10.8)

## 2025-06-10 RX ADMIN — HEPARIN SODIUM 5000 UNITS: 5000 INJECTION, SOLUTION INTRAVENOUS; SUBCUTANEOUS at 20:20

## 2025-06-10 RX ADMIN — CALCIUM GLUCONATE 1000 MG: 98 INJECTION, SOLUTION INTRAVENOUS at 17:15

## 2025-06-10 RX ADMIN — ACETAMINOPHEN 1000 MG: 500 TABLET ORAL at 15:14

## 2025-06-11 ENCOUNTER — HOSPITAL ENCOUNTER (INPATIENT)
Dept: HOSPITAL 99 - 3 WEST ACU | Age: 85
LOS: 2 days | Discharge: NURSING, REHAB OR LONG-TERM CARE | DRG: 641 | End: 2025-06-13
Payer: COMMERCIAL

## 2025-06-11 VITALS — DIASTOLIC BLOOD PRESSURE: 95 MMHG | HEART RATE: 74 BPM | SYSTOLIC BLOOD PRESSURE: 160 MMHG | OXYGEN SATURATION: 98 %

## 2025-06-11 VITALS — OXYGEN SATURATION: 98 % | DIASTOLIC BLOOD PRESSURE: 95 MMHG | HEART RATE: 74 BPM | SYSTOLIC BLOOD PRESSURE: 160 MMHG

## 2025-06-11 VITALS — SYSTOLIC BLOOD PRESSURE: 122 MMHG | DIASTOLIC BLOOD PRESSURE: 89 MMHG

## 2025-06-11 VITALS — SYSTOLIC BLOOD PRESSURE: 143 MMHG | DIASTOLIC BLOOD PRESSURE: 93 MMHG

## 2025-06-11 VITALS — BODY MASS INDEX: 17.9 KG/M2 | BODY MASS INDEX: 17.8 KG/M2 | BODY MASS INDEX: 18 KG/M2 | BODY MASS INDEX: 18.1 KG/M2

## 2025-06-11 VITALS — DIASTOLIC BLOOD PRESSURE: 95 MMHG | SYSTOLIC BLOOD PRESSURE: 129 MMHG

## 2025-06-11 VITALS — SYSTOLIC BLOOD PRESSURE: 110 MMHG | DIASTOLIC BLOOD PRESSURE: 74 MMHG

## 2025-06-11 VITALS — SYSTOLIC BLOOD PRESSURE: 127 MMHG | DIASTOLIC BLOOD PRESSURE: 89 MMHG

## 2025-06-11 DIAGNOSIS — Z86.73: ICD-10-CM

## 2025-06-11 DIAGNOSIS — Z86.718: ICD-10-CM

## 2025-06-11 DIAGNOSIS — J44.9: ICD-10-CM

## 2025-06-11 DIAGNOSIS — F10.10: ICD-10-CM

## 2025-06-11 DIAGNOSIS — Z88.2: ICD-10-CM

## 2025-06-11 DIAGNOSIS — Z98.2: ICD-10-CM

## 2025-06-11 DIAGNOSIS — Z87.440: ICD-10-CM

## 2025-06-11 DIAGNOSIS — Z85.818: ICD-10-CM

## 2025-06-11 DIAGNOSIS — E83.51: Primary | ICD-10-CM

## 2025-06-11 DIAGNOSIS — Z87.19: ICD-10-CM

## 2025-06-11 DIAGNOSIS — Z85.810: ICD-10-CM

## 2025-06-11 DIAGNOSIS — R73.9: ICD-10-CM

## 2025-06-11 DIAGNOSIS — E78.00: ICD-10-CM

## 2025-06-11 DIAGNOSIS — I25.10: ICD-10-CM

## 2025-06-11 DIAGNOSIS — F17.210: ICD-10-CM

## 2025-06-11 DIAGNOSIS — Z88.0: ICD-10-CM

## 2025-06-11 DIAGNOSIS — Z86.711: ICD-10-CM

## 2025-06-11 DIAGNOSIS — Z85.828: ICD-10-CM

## 2025-06-11 DIAGNOSIS — K21.00: ICD-10-CM

## 2025-06-11 DIAGNOSIS — G91.2: ICD-10-CM

## 2025-06-11 DIAGNOSIS — I10: ICD-10-CM

## 2025-06-11 DIAGNOSIS — N40.0: ICD-10-CM

## 2025-06-11 DIAGNOSIS — I73.00: ICD-10-CM

## 2025-06-11 DIAGNOSIS — Z85.51: ICD-10-CM

## 2025-06-11 DIAGNOSIS — E87.6: ICD-10-CM

## 2025-06-11 DIAGNOSIS — M25.572: ICD-10-CM

## 2025-06-11 DIAGNOSIS — R63.6: ICD-10-CM

## 2025-06-11 DIAGNOSIS — Z91.013: ICD-10-CM

## 2025-06-11 DIAGNOSIS — M81.0: ICD-10-CM

## 2025-06-11 DIAGNOSIS — E83.42: ICD-10-CM

## 2025-06-11 DIAGNOSIS — Z88.1: ICD-10-CM

## 2025-06-11 DIAGNOSIS — Z96.642: ICD-10-CM

## 2025-06-11 DIAGNOSIS — Z87.01: ICD-10-CM

## 2025-06-11 DIAGNOSIS — D64.9: ICD-10-CM

## 2025-06-11 DIAGNOSIS — Z95.5: ICD-10-CM

## 2025-06-11 LAB
ALBUMIN SERPL-MCNC: 3.7 G/DL (ref 3.5–5)
ALP SERPL-CCNC: 70 U/L (ref 38–126)
ALT SERPL-CCNC: 10 U/L (ref 0–50)
AST SERPL-CCNC: 18 U/L (ref 17–59)
BILIRUB SERPL-MCNC: 0.6 MG/DL (ref 0.2–1.3)
BUN SERPL-MCNC: 13 MG/DL (ref 9–20)
BUN SERPL-MCNC: 15 MG/DL (ref 9–20)
CALCIUM SERPL-MCNC: 6 MG/DL (ref 8.4–10.2)
CALCIUM SERPL-MCNC: 6.8 MG/DL (ref 8.4–10.2)
CHLORIDE SERPL-SCNC: 104 MMOL/L (ref 98–107)
CHLORIDE SERPL-SCNC: 105 MMOL/L (ref 98–107)
CO2 SERPL-SCNC: 24 MMOL/L (ref 22–30)
CO2 SERPL-SCNC: 27 MMOL/L (ref 22–30)
COMMENT: (no result)
CREAT SERPL-MCNC: 1 MG/DL (ref 0.7–1.3)
CREAT SERPL-MCNC: 1.1 MG/DL (ref 0.7–1.3)
EGFR: > 60
EGFR: > 60
ERYTHROCYTE [DISTWIDTH] IN BLOOD BY AUTOMATED COUNT: 13.9 % (ref 11.5–14.5)
ESTIMATED CREATININE CLEARANCE: 42 ML/MIN
ESTIMATED CREATININE CLEARANCE: 47 ML/MIN
GLUCOSE SERPL-MCNC: 124 MG/DL (ref 70–99)
GLUCOSE SERPL-MCNC: 81 MG/DL (ref 70–99)
HCT VFR BLD AUTO: 35.5 % (ref 39–52)
HGB BLD-MCNC: 12.2 G/DL (ref 13–18)
MAGNESIUM SERPL-MCNC: 0.5 MG/DL (ref 1.6–2.3)
MCH RBC QN AUTO: 31.6 PG (ref 27–31)
MCHC RBC AUTO-ENTMCNC: 34.4 G/DL (ref 33–37)
MCV RBC AUTO: 92 FL (ref 80–94)
PLATELET # BLD AUTO: 242 10^3/UL (ref 130–400)
PMV BLD AUTO: 9.8 FL (ref 7.4–10.4)
POTASSIUM SERPL-SCNC: 3.2 MMOL/L (ref 3.5–5.1)
POTASSIUM SERPL-SCNC: 3.5 MMOL/L (ref 3.5–5.1)
PROT SERPL-MCNC: 6.2 G/DL (ref 6.3–8.2)
RBC # BLD AUTO: 3.86 10^6/UL (ref 4.7–6.1)
SODIUM SERPL-SCNC: 139 MMOL/L (ref 135–145)
SODIUM SERPL-SCNC: 139 MMOL/L (ref 135–145)
TSH SERPL DL<=0.05 MIU/L-ACNC: 1.2 UIU/ML (ref 0.47–4.68)
URATE SERPL-MCNC: 7.8 MG/DL (ref 3.5–8.5)
WBC # BLD AUTO: 3.9 10^3/UL (ref 4.8–10.8)

## 2025-06-11 RX ADMIN — HEPARIN SODIUM 5000 UNITS: 5000 INJECTION, SOLUTION INTRAVENOUS; SUBCUTANEOUS at 07:43

## 2025-06-11 RX ADMIN — CALCIUM GLUCONATE 1000 MG: 98 INJECTION, SOLUTION INTRAVENOUS at 10:04

## 2025-06-11 RX ADMIN — POTASSIUM CHLORIDE 40 MEQ: 1500 TABLET, EXTENDED RELEASE ORAL at 10:07

## 2025-06-11 RX ADMIN — HEPARIN SODIUM 5000 UNITS: 5000 INJECTION, SOLUTION INTRAVENOUS; SUBCUTANEOUS at 20:57

## 2025-06-11 RX ADMIN — CALCIUM GLUCONATE 1000 MG: 98 INJECTION, SOLUTION INTRAVENOUS at 16:15

## 2025-06-11 RX ADMIN — MAGNESIUM SULFATE HEPTAHYDRATE 50: 2 INJECTION, SOLUTION INTRAVENOUS at 10:06

## 2025-06-11 RX ADMIN — Medication 2 FLUSH: at 16:16

## 2025-06-12 VITALS — DIASTOLIC BLOOD PRESSURE: 91 MMHG | SYSTOLIC BLOOD PRESSURE: 134 MMHG

## 2025-06-12 VITALS — SYSTOLIC BLOOD PRESSURE: 118 MMHG | DIASTOLIC BLOOD PRESSURE: 78 MMHG

## 2025-06-12 VITALS — SYSTOLIC BLOOD PRESSURE: 135 MMHG | DIASTOLIC BLOOD PRESSURE: 91 MMHG

## 2025-06-12 VITALS — SYSTOLIC BLOOD PRESSURE: 125 MMHG | DIASTOLIC BLOOD PRESSURE: 81 MMHG

## 2025-06-12 VITALS — DIASTOLIC BLOOD PRESSURE: 81 MMHG | SYSTOLIC BLOOD PRESSURE: 118 MMHG

## 2025-06-12 VITALS — DIASTOLIC BLOOD PRESSURE: 81 MMHG | SYSTOLIC BLOOD PRESSURE: 124 MMHG

## 2025-06-12 LAB
BUN SERPL-MCNC: 12 MG/DL (ref 9–20)
CALCIUM SERPL-MCNC: 6.7 MG/DL (ref 8.4–10.2)
CHLORIDE SERPL-SCNC: 106 MMOL/L (ref 98–107)
CO2 SERPL-SCNC: 24 MMOL/L (ref 22–30)
COMMENT: (no result)
CREAT SERPL-MCNC: 0.9 MG/DL (ref 0.7–1.3)
EGFR: > 60
ERYTHROCYTE [DISTWIDTH] IN BLOOD BY AUTOMATED COUNT: 13.7 % (ref 11.5–14.5)
ESTIMATED CREATININE CLEARANCE: 52 ML/MIN
GLUCOSE SERPL-MCNC: 84 MG/DL (ref 70–99)
HCT VFR BLD AUTO: 35.3 % (ref 39–52)
HGB BLD-MCNC: 12.2 G/DL (ref 13–18)
MAGNESIUM SERPL-MCNC: 1 MG/DL (ref 1.6–2.3)
MCH RBC QN AUTO: 31.5 PG (ref 27–31)
MCHC RBC AUTO-ENTMCNC: 34.6 G/DL (ref 33–37)
MCV RBC AUTO: 91.2 FL (ref 80–94)
PLATELET # BLD AUTO: 243 10^3/UL (ref 130–400)
PMV BLD AUTO: 9.5 FL (ref 7.4–10.4)
POTASSIUM SERPL-SCNC: 3.4 MMOL/L (ref 3.5–5.1)
RBC # BLD AUTO: 3.87 10^6/UL (ref 4.7–6.1)
SODIUM SERPL-SCNC: 138 MMOL/L (ref 135–145)
WBC # BLD AUTO: 3.8 10^3/UL (ref 4.8–10.8)

## 2025-06-12 RX ADMIN — CALCIUM CARBONATE-VITAMIN D TAB 500 MG-200 UNIT 500 MG: 500-200 TAB at 08:56

## 2025-06-12 RX ADMIN — CALCIUM GLUCONATE 100: 20 INJECTION, SOLUTION INTRAVENOUS at 07:16

## 2025-06-12 RX ADMIN — MAGNESIUM SULFATE 100: 4 INJECTION INTRAVENOUS at 08:57

## 2025-06-12 RX ADMIN — CALCIUM GLUCONATE 1000 MG: 98 INJECTION, SOLUTION INTRAVENOUS at 09:02

## 2025-06-12 RX ADMIN — HEPARIN SODIUM 5000 UNITS: 5000 INJECTION, SOLUTION INTRAVENOUS; SUBCUTANEOUS at 20:51

## 2025-06-12 RX ADMIN — TAMSULOSIN HYDROCHLORIDE 0.4 MG: 0.4 CAPSULE ORAL at 13:05

## 2025-06-12 RX ADMIN — CALCIUM CARBONATE-VITAMIN D TAB 500 MG-200 UNIT 500 MG: 500-200 TAB at 20:52

## 2025-06-12 RX ADMIN — HEPARIN SODIUM 5000 UNITS: 5000 INJECTION, SOLUTION INTRAVENOUS; SUBCUTANEOUS at 07:15

## 2025-06-12 RX ADMIN — Medication 2 FLUSH: at 07:17

## 2025-06-12 RX ADMIN — POTASSIUM CHLORIDE 40 MEQ: 1.5 POWDER, FOR SOLUTION ORAL at 08:56

## 2025-06-13 ENCOUNTER — HOSPITAL ENCOUNTER (EMERGENCY)
Dept: HOSPITAL 99 - EMR | Age: 85
LOS: 1 days | Discharge: HOME | End: 2025-06-14
Payer: COMMERCIAL

## 2025-06-13 VITALS — SYSTOLIC BLOOD PRESSURE: 88 MMHG | DIASTOLIC BLOOD PRESSURE: 62 MMHG | OXYGEN SATURATION: 98 % | HEART RATE: 73 BPM

## 2025-06-13 VITALS — DIASTOLIC BLOOD PRESSURE: 63 MMHG | SYSTOLIC BLOOD PRESSURE: 135 MMHG

## 2025-06-13 VITALS — SYSTOLIC BLOOD PRESSURE: 91 MMHG | DIASTOLIC BLOOD PRESSURE: 60 MMHG | HEART RATE: 79 BPM | OXYGEN SATURATION: 96 %

## 2025-06-13 VITALS — DIASTOLIC BLOOD PRESSURE: 83 MMHG | SYSTOLIC BLOOD PRESSURE: 120 MMHG

## 2025-06-13 VITALS — DIASTOLIC BLOOD PRESSURE: 87 MMHG | SYSTOLIC BLOOD PRESSURE: 130 MMHG

## 2025-06-13 VITALS — BODY MASS INDEX: 19.3 KG/M2

## 2025-06-13 VITALS — SYSTOLIC BLOOD PRESSURE: 114 MMHG | DIASTOLIC BLOOD PRESSURE: 71 MMHG

## 2025-06-13 VITALS — SYSTOLIC BLOOD PRESSURE: 98 MMHG | DIASTOLIC BLOOD PRESSURE: 80 MMHG

## 2025-06-13 DIAGNOSIS — J44.9: ICD-10-CM

## 2025-06-13 DIAGNOSIS — K21.9: ICD-10-CM

## 2025-06-13 DIAGNOSIS — Z85.810: ICD-10-CM

## 2025-06-13 DIAGNOSIS — Z87.440: ICD-10-CM

## 2025-06-13 DIAGNOSIS — E78.00: ICD-10-CM

## 2025-06-13 DIAGNOSIS — Z86.73: ICD-10-CM

## 2025-06-13 DIAGNOSIS — F17.200: ICD-10-CM

## 2025-06-13 DIAGNOSIS — Z85.828: ICD-10-CM

## 2025-06-13 DIAGNOSIS — Z90.79: ICD-10-CM

## 2025-06-13 DIAGNOSIS — Z82.49: ICD-10-CM

## 2025-06-13 DIAGNOSIS — R53.81: Primary | ICD-10-CM

## 2025-06-13 DIAGNOSIS — I11.9: ICD-10-CM

## 2025-06-13 DIAGNOSIS — Z85.818: ICD-10-CM

## 2025-06-13 DIAGNOSIS — Z95.5: ICD-10-CM

## 2025-06-13 DIAGNOSIS — Z98.2: ICD-10-CM

## 2025-06-13 DIAGNOSIS — I73.00: ICD-10-CM

## 2025-06-13 DIAGNOSIS — Z87.19: ICD-10-CM

## 2025-06-13 DIAGNOSIS — G91.2: ICD-10-CM

## 2025-06-13 DIAGNOSIS — Z86.718: ICD-10-CM

## 2025-06-13 LAB
BUN SERPL-MCNC: 12 MG/DL (ref 9–20)
CALCIUM SERPL-MCNC: 8.2 MG/DL (ref 8.4–10.2)
CHLORIDE SERPL-SCNC: 104 MMOL/L (ref 98–107)
CO2 SERPL-SCNC: 26 MMOL/L (ref 22–30)
COMMENT: (no result)
CREAT SERPL-MCNC: 0.9 MG/DL (ref 0.7–1.3)
EGFR: > 60
ERYTHROCYTE [DISTWIDTH] IN BLOOD BY AUTOMATED COUNT: 13.3 % (ref 11.5–14.5)
ESTIMATED CREATININE CLEARANCE: 52 ML/MIN
GLUCOSE SERPL-MCNC: 87 MG/DL (ref 70–99)
HCT VFR BLD AUTO: 34.6 % (ref 39–52)
HGB BLD-MCNC: 12 G/DL (ref 13–18)
MAGNESIUM SERPL-MCNC: 1.9 MG/DL (ref 1.6–2.3)
MCH RBC QN AUTO: 32 PG (ref 27–31)
MCHC RBC AUTO-ENTMCNC: 34.7 G/DL (ref 33–37)
MCV RBC AUTO: 92.3 FL (ref 80–94)
PLATELET # BLD AUTO: 258 10^3/UL (ref 130–400)
PMV BLD AUTO: 9.6 FL (ref 7.4–10.4)
POTASSIUM SERPL-SCNC: 4.3 MMOL/L (ref 3.5–5.1)
PTH-INTACT SERPL-MCNC: 28.4 PG/ML (ref 13.6–85.8)
RBC # BLD AUTO: 3.75 10^6/UL (ref 4.7–6.1)
SODIUM SERPL-SCNC: 134 MMOL/L (ref 135–145)
WBC # BLD AUTO: 4.3 10^3/UL (ref 4.8–10.8)

## 2025-06-13 PROCEDURE — 99283 EMERGENCY DEPT VISIT LOW MDM: CPT

## 2025-06-13 RX ADMIN — PANTOPRAZOLE SODIUM 20 MG: 20 TABLET, DELAYED RELEASE ORAL at 08:26

## 2025-06-13 RX ADMIN — HEPARIN SODIUM 5000 UNITS: 5000 INJECTION, SOLUTION INTRAVENOUS; SUBCUTANEOUS at 08:28

## 2025-06-13 RX ADMIN — LISINOPRIL 40 MG: 20 TABLET ORAL at 08:34

## 2025-06-13 RX ADMIN — TAMSULOSIN HYDROCHLORIDE 0.4 MG: 0.4 CAPSULE ORAL at 08:26

## 2025-06-13 RX ADMIN — CALCIUM CARBONATE-VITAMIN D TAB 500 MG-200 UNIT 500 MG: 500-200 TAB at 08:27

## 2025-06-13 RX ADMIN — FOLIC ACID 1 MG: 1 TABLET ORAL at 12:01

## 2025-06-13 RX ADMIN — Medication 100 MG: at 12:01

## 2025-06-13 RX ADMIN — Medication 500 MG: at 08:26

## 2025-06-14 VITALS — HEART RATE: 80 BPM | DIASTOLIC BLOOD PRESSURE: 96 MMHG | OXYGEN SATURATION: 95 % | SYSTOLIC BLOOD PRESSURE: 130 MMHG

## 2025-06-14 VITALS — SYSTOLIC BLOOD PRESSURE: 123 MMHG | DIASTOLIC BLOOD PRESSURE: 96 MMHG

## 2025-06-14 VITALS — SYSTOLIC BLOOD PRESSURE: 132 MMHG | DIASTOLIC BLOOD PRESSURE: 95 MMHG

## 2025-06-14 VITALS — DIASTOLIC BLOOD PRESSURE: 95 MMHG | SYSTOLIC BLOOD PRESSURE: 132 MMHG

## 2025-06-14 VITALS — SYSTOLIC BLOOD PRESSURE: 130 MMHG | DIASTOLIC BLOOD PRESSURE: 96 MMHG

## 2025-07-12 ENCOUNTER — HOSPITAL ENCOUNTER (EMERGENCY)
Dept: HOSPITAL 99 - EMR | Age: 85
Discharge: ANOTHER HOSPITAL | End: 2025-07-12
Payer: COMMERCIAL

## 2025-07-12 VITALS — BODY MASS INDEX: 19.9 KG/M2

## 2025-07-12 VITALS — SYSTOLIC BLOOD PRESSURE: 111 MMHG | DIASTOLIC BLOOD PRESSURE: 85 MMHG

## 2025-07-12 VITALS — SYSTOLIC BLOOD PRESSURE: 107 MMHG | DIASTOLIC BLOOD PRESSURE: 69 MMHG

## 2025-07-12 VITALS — SYSTOLIC BLOOD PRESSURE: 96 MMHG | DIASTOLIC BLOOD PRESSURE: 61 MMHG

## 2025-07-12 VITALS — DIASTOLIC BLOOD PRESSURE: 68 MMHG | SYSTOLIC BLOOD PRESSURE: 84 MMHG

## 2025-07-12 VITALS — SYSTOLIC BLOOD PRESSURE: 89 MMHG | DIASTOLIC BLOOD PRESSURE: 70 MMHG

## 2025-07-12 VITALS — DIASTOLIC BLOOD PRESSURE: 59 MMHG | SYSTOLIC BLOOD PRESSURE: 77 MMHG

## 2025-07-12 VITALS — DIASTOLIC BLOOD PRESSURE: 73 MMHG | SYSTOLIC BLOOD PRESSURE: 97 MMHG

## 2025-07-12 DIAGNOSIS — F17.200: ICD-10-CM
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
## 2025-07-12 DIAGNOSIS — S72.342A: Primary | ICD-10-CM

## 2025-07-12 DIAGNOSIS — Z86.73: ICD-10-CM

## 2025-07-12 DIAGNOSIS — I10: ICD-10-CM

## 2025-07-12 DIAGNOSIS — Z98.2: ICD-10-CM

## 2025-07-12 DIAGNOSIS — J44.9: ICD-10-CM

## 2025-07-12 DIAGNOSIS — K21.9: ICD-10-CM

## 2025-07-12 DIAGNOSIS — F10.129: ICD-10-CM

## 2025-07-12 DIAGNOSIS — M97.02XA: ICD-10-CM

## 2025-07-12 DIAGNOSIS — E78.00: ICD-10-CM
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
## 2025-07-12 DIAGNOSIS — G91.9: ICD-10-CM

## 2025-07-12 LAB
ALBUMIN SERPL-MCNC: 4.1 G/DL (ref 3.5–5)
ALP SERPL-CCNC: 64 U/L (ref 38–126)
ALT SERPL-CCNC: 11 U/L (ref 0–50)
AST SERPL-CCNC: 20 U/L (ref 17–59)
BASOPHILS # BLD AUTO: 0 10^3/UL (ref 0–0.2)
BASOPHILS NFR BLD AUTO: 0.2 % (ref 0–2)
BILIRUB SERPL-MCNC: 0.5 MG/DL (ref 0.2–1.3)
BUN SERPL-MCNC: 16 MG/DL (ref 9–20)
CALCIUM SERPL-MCNC: 8.8 MG/DL (ref 8.4–10.2)
CHLORIDE SERPL-SCNC: 102 MMOL/L (ref 98–107)
CO2 SERPL-SCNC: 26 MMOL/L (ref 22–30)
COMMENT: (no result)
CREAT SERPL-MCNC: 1 MG/DL (ref 0.7–1.3)
EGFR: > 60
EOSINOPHIL # BLD AUTO: 0 10^3/UL (ref 0–0.7)
EOSINOPHIL NFR BLD AUTO: 0.3 % (ref 0–6)
ERYTHROCYTE [DISTWIDTH] IN BLOOD BY AUTOMATED COUNT: 14.8 % (ref 11.5–14.5)
ESTIMATED CREATININE CLEARANCE: 49 ML/MIN
ETHANOL SERPL-MCNC: 152 MG/DL
GLUCOSE SERPL-MCNC: 104 MG/DL (ref 70–99)
HCT VFR BLD AUTO: 33.5 % (ref 39–52)
HGB BLD-MCNC: 11 G/DL (ref 13–18)
IMM GRANULOCYTES # BLD AUTO: 0 10^3/UL (ref 0–0.05)
IMM GRANULOCYTES NFR BLD AUTO: 0.3 % (ref 0–0.5)
LYMPHOCYTES # BLD AUTO: 0.2 10^3/UL (ref 1.2–3.4)
LYMPHOCYTES NFR BLD AUTO: 1.7 % (ref 20.5–51.1)
MCH RBC QN AUTO: 31.4 PG (ref 27–31)
MCHC RBC AUTO-ENTMCNC: 32.8 G/DL (ref 33–37)
MCV RBC AUTO: 95.7 FL (ref 80–94)
MONOCYTES # BLD AUTO: 0.4 10^3/UL (ref 0.1–0.6)
MONOCYTES NFR BLD AUTO: 3.9 % (ref 1.7–9.3)
NEUTROPHILS # BLD AUTO: 8.6 10^3/UL (ref 1.4–6.5)
NEUTROPHILS NFR BLD AUTO: 93.6 % (ref 42.2–75.2)
NRBC BLD AUTO-RTO: 0 %
PLATELET # BLD AUTO: 189 10^3/UL (ref 130–400)
PMV BLD AUTO: 9.1 FL (ref 7.4–10.4)
POTASSIUM SERPL-SCNC: 4 MMOL/L (ref 3.5–5.1)
PROT SERPL-MCNC: 6.8 G/DL (ref 6.3–8.2)
RBC # BLD AUTO: 3.5 10^6/UL (ref 4.7–6.1)
SODIUM SERPL-SCNC: 136 MMOL/L (ref 135–145)
WBC # BLD AUTO: 9.2 10^3/UL (ref 4.8–10.8)

## 2025-07-12 PROCEDURE — 96361 HYDRATE IV INFUSION ADD-ON: CPT

## 2025-07-12 PROCEDURE — 96374 THER/PROPH/DIAG INJ IV PUSH: CPT

## 2025-07-12 PROCEDURE — 99285 EMERGENCY DEPT VISIT HI MDM: CPT

## 2025-07-12 PROCEDURE — 96376 TX/PRO/DX INJ SAME DRUG ADON: CPT

## 2025-07-12 RX ADMIN — MORPHINE SULFATE 2 MG: 2 INJECTION, SOLUTION INTRAMUSCULAR; INTRAVENOUS at 06:22

## 2025-07-12 RX ADMIN — SODIUM CHLORIDE, SODIUM LACTATE, POTASSIUM CHLORIDE, AND CALCIUM CHLORIDE 1000: 600; 310; 30; 20 INJECTION, SOLUTION INTRAVENOUS at 05:16

## 2025-07-12 RX ADMIN — SODIUM CHLORIDE 1000: 900 INJECTION, SOLUTION INTRAVENOUS at 02:45

## 2025-07-12 RX ADMIN — SODIUM CHLORIDE 1000: 900 INJECTION, SOLUTION INTRAVENOUS at 05:54

## 2025-07-12 RX ADMIN — MORPHINE SULFATE 2 MG: 2 INJECTION, SOLUTION INTRAMUSCULAR; INTRAVENOUS at 04:26

## 2025-07-18 ENCOUNTER — HOSPITAL ENCOUNTER (OUTPATIENT)
Dept: HOSPITAL 99 - OLABP | Age: 85
End: 2025-07-18
Payer: COMMERCIAL

## 2025-07-18 DIAGNOSIS — G91.2: ICD-10-CM
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
## 2025-07-18 DIAGNOSIS — K21.9: ICD-10-CM

## 2025-07-18 DIAGNOSIS — I10: ICD-10-CM

## 2025-07-18 DIAGNOSIS — Z86.73: ICD-10-CM

## 2025-07-18 DIAGNOSIS — F10.20: ICD-10-CM

## 2025-07-18 DIAGNOSIS — M87.059: ICD-10-CM

## 2025-07-18 DIAGNOSIS — M97.02XD: Primary | ICD-10-CM

## 2025-07-18 DIAGNOSIS — D64.9: ICD-10-CM

## 2025-07-18 LAB
BUN SERPL-MCNC: 17 MG/DL (ref 9–20)
CALCIUM SERPL-MCNC: 8.2 MG/DL (ref 8.4–10.2)
CHLORIDE SERPL-SCNC: 101 MMOL/L (ref 98–107)
CO2 SERPL-SCNC: 27 MMOL/L (ref 22–30)
COMMENT: (no result)
CREAT SERPL-MCNC: 0.9 MG/DL (ref 0.7–1.3)
EGFR: > 60
ERYTHROCYTE [DISTWIDTH] IN BLOOD BY AUTOMATED COUNT: 16.1 % (ref 11.5–14.5)
ESTIMATED CREATININE CLEARANCE: (no result) ML/MIN
GLUCOSE SERPL-MCNC: 84 MG/DL (ref 70–99)
HCT VFR BLD AUTO: 24.6 % (ref 39–52)
HGB BLD-MCNC: 8.1 G/DL (ref 13–18)
MCH RBC QN AUTO: 30.6 PG (ref 27–31)
MCHC RBC AUTO-ENTMCNC: 32.9 G/DL (ref 33–37)
MCV RBC AUTO: 92.8 FL (ref 80–94)
PLATELET # BLD AUTO: 268 10^3/UL (ref 130–400)
PMV BLD AUTO: 9.7 FL (ref 7.4–10.4)
POTASSIUM SERPL-SCNC: 4.3 MMOL/L (ref 3.5–5.1)
RBC # BLD AUTO: 2.65 10^6/UL (ref 4.7–6.1)
SODIUM SERPL-SCNC: 130 MMOL/L (ref 135–145)
WBC # BLD AUTO: 5.7 10^3/UL (ref 4.8–10.8)

## 2025-07-21 ENCOUNTER — HOSPITAL ENCOUNTER (OUTPATIENT)
Dept: HOSPITAL 99 - OLABP | Age: 85
End: 2025-07-21
Payer: COMMERCIAL

## 2025-07-21 DIAGNOSIS — I10: ICD-10-CM

## 2025-07-21 DIAGNOSIS — M87.059: ICD-10-CM

## 2025-07-21 DIAGNOSIS — K21.9: ICD-10-CM

## 2025-07-21 DIAGNOSIS — F10.20: ICD-10-CM

## 2025-07-21 DIAGNOSIS — D64.9: ICD-10-CM

## 2025-07-21 DIAGNOSIS — G91.2: ICD-10-CM

## 2025-07-21 DIAGNOSIS — Z86.73: ICD-10-CM

## 2025-07-21 DIAGNOSIS — M97.02XD: Primary | ICD-10-CM
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
## 2025-07-21 LAB
BASOPHILS # BLD AUTO: 0.1 10^3/UL (ref 0–0.2)
BASOPHILS NFR BLD AUTO: 0.8 % (ref 0–2)
BUN SERPL-MCNC: 19 MG/DL (ref 9–20)
CALCIUM SERPL-MCNC: 8.4 MG/DL (ref 8.4–10.2)
CHLORIDE SERPL-SCNC: 103 MMOL/L (ref 98–107)
CO2 SERPL-SCNC: 27 MMOL/L (ref 22–30)
COMMENT: (no result)
CREAT SERPL-MCNC: 0.9 MG/DL (ref 0.7–1.3)
EGFR: > 60
EOSINOPHIL # BLD AUTO: 0.5 10^3/UL (ref 0–0.7)
EOSINOPHIL NFR BLD AUTO: 7.8 % (ref 0–6)
ERYTHROCYTE [DISTWIDTH] IN BLOOD BY AUTOMATED COUNT: 16.8 % (ref 11.5–14.5)
ESTIMATED CREATININE CLEARANCE: (no result) ML/MIN
GLUCOSE SERPL-MCNC: 82 MG/DL (ref 70–99)
HCT VFR BLD AUTO: 24.4 % (ref 39–52)
HGB BLD-MCNC: 8 G/DL (ref 13–18)
IMM GRANULOCYTES # BLD AUTO: 0.1 10^3/UL (ref 0–0.05)
IMM GRANULOCYTES NFR BLD AUTO: 1.6 % (ref 0–0.5)
LYMPHOCYTES # BLD AUTO: 0.6 10^3/UL (ref 1.2–3.4)
LYMPHOCYTES NFR BLD AUTO: 9.9 % (ref 20.5–51.1)
MCH RBC QN AUTO: 30.8 PG (ref 27–31)
MCHC RBC AUTO-ENTMCNC: 32.8 G/DL (ref 33–37)
MCV RBC AUTO: 93.8 FL (ref 80–94)
MONOCYTES # BLD AUTO: 0.8 10^3/UL (ref 0.1–0.6)
MONOCYTES NFR BLD AUTO: 12.3 % (ref 1.7–9.3)
NEUTROPHILS # BLD AUTO: 4.2 10^3/UL (ref 1.4–6.5)
NEUTROPHILS NFR BLD AUTO: 67.6 % (ref 42.2–75.2)
NRBC BLD AUTO-RTO: 0 %
PLATELET # BLD AUTO: 464 10^3/UL (ref 130–400)
PMV BLD AUTO: 9.1 FL (ref 7.4–10.4)
POTASSIUM SERPL-SCNC: 4.4 MMOL/L (ref 3.5–5.1)
RBC # BLD AUTO: 2.6 10^6/UL (ref 4.7–6.1)
SODIUM SERPL-SCNC: 134 MMOL/L (ref 135–145)
WBC # BLD AUTO: 6.3 10^3/UL (ref 4.8–10.8)

## 2025-07-25 ENCOUNTER — HOSPITAL ENCOUNTER (OUTPATIENT)
Dept: HOSPITAL 99 - OLABP | Age: 85
End: 2025-07-25
Payer: COMMERCIAL

## 2025-07-25 DIAGNOSIS — F10.20: Primary | ICD-10-CM

## 2025-07-25 DIAGNOSIS — K21.9: ICD-10-CM

## 2025-07-25 DIAGNOSIS — M87.059: ICD-10-CM

## 2025-07-25 DIAGNOSIS — D64.9: ICD-10-CM

## 2025-07-25 DIAGNOSIS — Z86.73: ICD-10-CM

## 2025-07-25 DIAGNOSIS — G91.2: ICD-10-CM

## 2025-07-25 DIAGNOSIS — I10: ICD-10-CM

## 2025-07-25 LAB
BASOPHILS # BLD AUTO: 0.1 10^3/UL (ref 0–0.2)
BASOPHILS NFR BLD AUTO: 0.8 % (ref 0–2)
BUN SERPL-MCNC: 21 MG/DL (ref 9–20)
CALCIUM SERPL-MCNC: 8.7 MG/DL (ref 8.4–10.2)
CHLORIDE SERPL-SCNC: 107 MMOL/L (ref 98–107)
CO2 SERPL-SCNC: 27 MMOL/L (ref 22–30)
COMMENT: (no result)
CREAT SERPL-MCNC: 0.9 MG/DL (ref 0.7–1.3)
EGFR: > 60
EOSINOPHIL # BLD AUTO: 0.7 10^3/UL (ref 0–0.7)
EOSINOPHIL NFR BLD AUTO: 10.3 % (ref 0–6)
ERYTHROCYTE [DISTWIDTH] IN BLOOD BY AUTOMATED COUNT: 17.2 % (ref 11.5–14.5)
ESTIMATED CREATININE CLEARANCE: (no result) ML/MIN
GLUCOSE SERPL-MCNC: 79 MG/DL (ref 70–99)
HCT VFR BLD AUTO: 25.5 % (ref 39–52)
HGB BLD-MCNC: 8.2 G/DL (ref 13–18)
IMM GRANULOCYTES # BLD AUTO: 0.1 10^3/UL (ref 0–0.05)
IMM GRANULOCYTES NFR BLD AUTO: 1.8 % (ref 0–0.5)
LYMPHOCYTES # BLD AUTO: 0.8 10^3/UL (ref 1.2–3.4)
LYMPHOCYTES NFR BLD AUTO: 12.2 % (ref 20.5–51.1)
MCH RBC QN AUTO: 30.8 PG (ref 27–31)
MCHC RBC AUTO-ENTMCNC: 32.2 G/DL (ref 33–37)
MCV RBC AUTO: 95.9 FL (ref 80–94)
MONOCYTES # BLD AUTO: 0.8 10^3/UL (ref 0.1–0.6)
MONOCYTES NFR BLD AUTO: 11.5 % (ref 1.7–9.3)
NEUTROPHILS # BLD AUTO: 4.2 10^3/UL (ref 1.4–6.5)
NEUTROPHILS NFR BLD AUTO: 63.4 % (ref 42.2–75.2)
NRBC BLD AUTO-RTO: 0 %
PLATELET # BLD AUTO: 629 10^3/UL (ref 130–400)
PMV BLD AUTO: 8.9 FL (ref 7.4–10.4)
POTASSIUM SERPL-SCNC: 4.6 MMOL/L (ref 3.5–5.1)
RBC # BLD AUTO: 2.66 10^6/UL (ref 4.7–6.1)
SODIUM SERPL-SCNC: 138 MMOL/L (ref 135–145)
WBC # BLD AUTO: 6.6 10^3/UL (ref 4.8–10.8)

## 2025-07-28 ENCOUNTER — HOSPITAL ENCOUNTER (OUTPATIENT)
Dept: HOSPITAL 99 - OLABP | Age: 85
End: 2025-07-28
Payer: COMMERCIAL

## 2025-07-28 DIAGNOSIS — K21.9: ICD-10-CM

## 2025-07-28 DIAGNOSIS — G91.2: ICD-10-CM

## 2025-07-28 DIAGNOSIS — F10.20: ICD-10-CM

## 2025-07-28 DIAGNOSIS — D64.9: ICD-10-CM

## 2025-07-28 DIAGNOSIS — Z86.73: ICD-10-CM

## 2025-07-28 DIAGNOSIS — M87.059: ICD-10-CM
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
## 2025-07-28 DIAGNOSIS — M97.02XD: Primary | ICD-10-CM

## 2025-07-28 DIAGNOSIS — I10: ICD-10-CM

## 2025-07-28 LAB
BUN SERPL-MCNC: 14 MG/DL (ref 9–20)
CALCIUM SERPL-MCNC: 8.4 MG/DL (ref 8.4–10.2)
CHLORIDE SERPL-SCNC: 104 MMOL/L (ref 98–107)
CO2 SERPL-SCNC: 26 MMOL/L (ref 22–30)
COMMENT: (no result)
CREAT SERPL-MCNC: 0.8 MG/DL (ref 0.7–1.3)
EGFR: > 60
ERYTHROCYTE [DISTWIDTH] IN BLOOD BY AUTOMATED COUNT: 17.1 % (ref 11.5–14.5)
ESTIMATED CREATININE CLEARANCE: (no result) ML/MIN
GLUCOSE SERPL-MCNC: 83 MG/DL (ref 70–99)
HCT VFR BLD AUTO: 25.7 % (ref 39–52)
HGB BLD-MCNC: 8.4 G/DL (ref 13–18)
MCH RBC QN AUTO: 30.9 PG (ref 27–31)
MCHC RBC AUTO-ENTMCNC: 32.7 G/DL (ref 33–37)
MCV RBC AUTO: 94.5 FL (ref 80–94)
PLATELET # BLD AUTO: 586 10^3/UL (ref 130–400)
PMV BLD AUTO: 8.9 FL (ref 7.4–10.4)
POTASSIUM SERPL-SCNC: 4.3 MMOL/L (ref 3.5–5.1)
RBC # BLD AUTO: 2.72 10^6/UL (ref 4.7–6.1)
SODIUM SERPL-SCNC: 135 MMOL/L (ref 135–145)
WBC # BLD AUTO: 7.2 10^3/UL (ref 4.8–10.8)

## 2025-07-29 ENCOUNTER — APPOINTMENT (OUTPATIENT)
Age: 85
End: 2025-07-29
Payer: COMMERCIAL

## 2025-07-29 ENCOUNTER — OFFICE VISIT (OUTPATIENT)
Age: 85
End: 2025-07-29

## 2025-07-29 VITALS — BODY MASS INDEX: 18.76 KG/M2 | HEIGHT: 71 IN | WEIGHT: 134 LBS

## 2025-07-29 DIAGNOSIS — Z98.890: Primary | ICD-10-CM

## 2025-07-29 DIAGNOSIS — Z87.81: Primary | ICD-10-CM

## 2025-07-29 PROCEDURE — 73552 X-RAY EXAM OF FEMUR 2/>: CPT

## 2025-07-29 PROCEDURE — 99024 POSTOP FOLLOW-UP VISIT: CPT

## 2025-07-29 RX ORDER — LISINOPRIL 20 MG/1
TABLET ORAL EVERY 24 HOURS
COMMUNITY

## 2025-07-29 RX ORDER — TAMSULOSIN HYDROCHLORIDE 0.4 MG/1
1 CAPSULE ORAL EVERY 24 HOURS
COMMUNITY

## 2025-07-29 RX ORDER — NITROFURANTOIN 25; 75 MG/1; MG/1
CAPSULE ORAL EVERY 12 HOURS
COMMUNITY
Start: 2025-07-11

## 2025-07-29 RX ORDER — FOLIC ACID 1 MG/1
TABLET ORAL EVERY 24 HOURS
COMMUNITY
Start: 2025-06-16 | End: 2025-10-14

## 2025-07-29 RX ORDER — PANTOPRAZOLE SODIUM 20 MG/1
TABLET, DELAYED RELEASE ORAL EVERY 24 HOURS
COMMUNITY
Start: 2025-06-16

## 2025-07-30 ENCOUNTER — TELEPHONE (OUTPATIENT)
Age: 85
End: 2025-07-30

## 2025-07-30 ENCOUNTER — HOSPITAL ENCOUNTER (OUTPATIENT)
Dept: HOSPITAL 99 - OLABP | Age: 85
End: 2025-07-30
Payer: COMMERCIAL

## 2025-07-30 DIAGNOSIS — G91.2: ICD-10-CM

## 2025-07-30 DIAGNOSIS — I10: Primary | ICD-10-CM

## 2025-07-30 DIAGNOSIS — M97.02XD: ICD-10-CM

## 2025-07-30 DIAGNOSIS — K21.9: ICD-10-CM

## 2025-07-30 DIAGNOSIS — Z86.73: ICD-10-CM

## 2025-07-30 DIAGNOSIS — M87.059: ICD-10-CM

## 2025-07-30 DIAGNOSIS — D64.9: ICD-10-CM

## 2025-07-30 DIAGNOSIS — F10.20: ICD-10-CM

## 2025-07-31 ENCOUNTER — HOSPITAL ENCOUNTER (OUTPATIENT)
Dept: HOSPITAL 99 - OLABP | Age: 85
End: 2025-07-31
Payer: COMMERCIAL

## 2025-07-31 DIAGNOSIS — D64.9: ICD-10-CM

## 2025-07-31 DIAGNOSIS — F10.20: Primary | ICD-10-CM

## 2025-07-31 DIAGNOSIS — M87.059: ICD-10-CM

## 2025-07-31 DIAGNOSIS — G91.2: ICD-10-CM

## 2025-07-31 DIAGNOSIS — Z86.73: ICD-10-CM

## 2025-07-31 DIAGNOSIS — K21.9: ICD-10-CM

## 2025-07-31 LAB
ALBUMIN UR-MCNC: (no result) G/DL
AMORPH SED URNS QL MICRO: (no result)
APPEARANCE UR: CLEAR
BASOPHILS # BLD AUTO: 0.1 10^3/UL (ref 0–0.2)
BASOPHILS NFR BLD AUTO: 1.2 % (ref 0–2)
BILIRUB UR QL STRIP.AUTO: NEGATIVE
BUN SERPL-MCNC: 14 MG/DL (ref 9–20)
CALCIUM SERPL-MCNC: 8.3 MG/DL (ref 8.4–10.2)
CAOX CRY URNS QL MICRO: (no result)
CHLORIDE SERPL-SCNC: 105 MMOL/L (ref 98–107)
CO2 SERPL-SCNC: 24 MMOL/L (ref 22–30)
COLOR UR: YELLOW
COMMENT: (no result)
CREAT SERPL-MCNC: 0.8 MG/DL (ref 0.7–1.3)
CYSTINE CRY URNS QL MICRO: (no result)
EGFR: > 60
EOSINOPHIL # BLD AUTO: 0.2 10^3/UL (ref 0–0.7)
EOSINOPHIL NFR BLD AUTO: 4.4 % (ref 0–6)
ERYTHROCYTE [DISTWIDTH] IN BLOOD BY AUTOMATED COUNT: 16.4 % (ref 11.5–14.5)
ESTIMATED CREATININE CLEARANCE: (no result) ML/MIN
GLUCOSE SERPL-MCNC: 79 MG/DL (ref 70–99)
GLUCOSE UR QL STRIP.AUTO: NEGATIVE
GRAN CASTS URNS QL MICRO: (no result) /LPF
HCT VFR BLD AUTO: 27.7 % (ref 39–52)
HGB BLD-MCNC: 9 G/DL (ref 13–18)
HGB UR QL: NEGATIVE
HYALINE CASTS URNS QL MICRO: (no result) /LPF (ref 0–2)
IMM GRANULOCYTES # BLD AUTO: 0 10^3/UL (ref 0–0.05)
IMM GRANULOCYTES NFR BLD AUTO: 0.6 % (ref 0–0.5)
LEUKOCYTE ESTERASE UR QL STRIP.AUTO: NEGATIVE
LYMPHOCYTES # BLD AUTO: 0.8 10^3/UL (ref 1.2–3.4)
LYMPHOCYTES NFR BLD AUTO: 15.7 % (ref 20.5–51.1)
Lab: (no result) /LPF
Lab: (no result) /LPF
MCH RBC QN AUTO: 31 PG (ref 27–31)
MCHC RBC AUTO-ENTMCNC: 32.5 G/DL (ref 33–37)
MCV RBC AUTO: 95.5 FL (ref 80–94)
MONOCYTES # BLD AUTO: 0.7 10^3/UL (ref 0.1–0.6)
MONOCYTES NFR BLD AUTO: 14.7 % (ref 1.7–9.3)
MUCOUS THREADS URNS QL MICRO: (no result)
NEUTROPHILS # BLD AUTO: 3.2 10^3/UL (ref 1.4–6.5)
NEUTROPHILS NFR BLD AUTO: 63.4 % (ref 42.2–75.2)
NITRITE UR QL: NEGATIVE
NRBC BLD AUTO-RTO: 0 %
PH UR: 6 [PH] (ref 5–9)
PLATELET # BLD AUTO: 531 10^3/UL (ref 130–400)
PMV BLD AUTO: 9.2 FL (ref 7.4–10.4)
POTASSIUM SERPL-SCNC: 4.1 MMOL/L (ref 3.5–5.1)
RBC # BLD AUTO: 2.9 10^6/UL (ref 4.7–6.1)
SODIUM SERPL-SCNC: 136 MMOL/L (ref 135–145)
SP GR UR: 1.01 (ref ?–1.03)
TRANS CELLS UR QL COMP ASSIST: (no result) /LPF
TRI-PHOS CRY URNS QL MICRO: (no result)
URATE CRY URNS QL MICRO: (no result)
URINE BACTERIA: (no result)
URINE EPITHELIAL CAST: (no result) /LPF
URINE KETONE: NEGATIVE
URINE OTHER: (no result)
URINE RED BLOOD CELL: (no result) /HPF (ref 0–2)
URINE RED CELL CAST: (no result) /LPF
URINE SPERM: (no result)
URINE TRICHOMONAS: (no result)
URINE WHITE CELL CAST: (no result) /LPF
UROBILINOGEN UR QL STRIP.AUTO: NEGATIVE
WBC # BLD AUTO: 5 10^3/UL (ref 4.8–10.8)
YEAST #/AREA URNS HPF: (no result) /[HPF]

## 2025-08-06 ENCOUNTER — TELEPHONE (OUTPATIENT)
Age: 85
End: 2025-08-06